# Patient Record
Sex: FEMALE | Race: WHITE | NOT HISPANIC OR LATINO
[De-identification: names, ages, dates, MRNs, and addresses within clinical notes are randomized per-mention and may not be internally consistent; named-entity substitution may affect disease eponyms.]

---

## 2017-01-05 ENCOUNTER — APPOINTMENT (OUTPATIENT)
Dept: INTERNAL MEDICINE | Facility: CLINIC | Age: 82
End: 2017-01-05

## 2017-01-05 ENCOUNTER — LABORATORY RESULT (OUTPATIENT)
Age: 82
End: 2017-01-05

## 2017-01-05 VITALS
OXYGEN SATURATION: 97 % | HEIGHT: 59 IN | DIASTOLIC BLOOD PRESSURE: 80 MMHG | WEIGHT: 127 LBS | SYSTOLIC BLOOD PRESSURE: 122 MMHG | HEART RATE: 102 BPM | BODY MASS INDEX: 25.6 KG/M2

## 2017-01-06 LAB
BASOPHILS # BLD AUTO: 0.1 K/UL
BASOPHILS NFR BLD AUTO: 1.9 %
EOSINOPHIL # BLD AUTO: 0.24 K/UL
EOSINOPHIL NFR BLD AUTO: 4.7 %
HCT VFR BLD CALC: 39.8 %
HGB BLD-MCNC: 12.4 G/DL
LYMPHOCYTES # BLD AUTO: 2.67 K/UL
LYMPHOCYTES NFR BLD AUTO: 51.9 %
MAN DIFF?: NORMAL
MCHC RBC-ENTMCNC: 27.9 PG
MCHC RBC-ENTMCNC: 31.2 GM/DL
MCV RBC AUTO: 89.4 FL
MONOCYTES # BLD AUTO: 0.58 K/UL
MONOCYTES NFR BLD AUTO: 11.3 %
NEUTROPHILS # BLD AUTO: 1.36 K/UL
NEUTROPHILS NFR BLD AUTO: 26.4 %
PLATELET # BLD AUTO: 244 K/UL
RBC # BLD: 4.45 M/UL
RBC # FLD: 20.9 %
WBC # FLD AUTO: 5.15 K/UL

## 2017-01-30 LAB — HEMOCCULT STL QL IA: POSITIVE

## 2017-02-23 ENCOUNTER — APPOINTMENT (OUTPATIENT)
Dept: COLORECTAL SURGERY | Facility: CLINIC | Age: 82
End: 2017-02-23

## 2017-03-09 ENCOUNTER — APPOINTMENT (OUTPATIENT)
Dept: COLORECTAL SURGERY | Facility: CLINIC | Age: 82
End: 2017-03-09

## 2017-03-23 ENCOUNTER — APPOINTMENT (OUTPATIENT)
Dept: COLORECTAL SURGERY | Facility: CLINIC | Age: 82
End: 2017-03-23

## 2017-05-01 ENCOUNTER — APPOINTMENT (OUTPATIENT)
Dept: INTERNAL MEDICINE | Facility: CLINIC | Age: 82
End: 2017-05-01

## 2017-05-01 VITALS
WEIGHT: 129 LBS | HEART RATE: 103 BPM | OXYGEN SATURATION: 98 % | HEIGHT: 59 IN | SYSTOLIC BLOOD PRESSURE: 125 MMHG | DIASTOLIC BLOOD PRESSURE: 80 MMHG | BODY MASS INDEX: 26 KG/M2

## 2017-05-02 LAB
25(OH)D3 SERPL-MCNC: 44.8 NG/ML
ALBUMIN SERPL ELPH-MCNC: 3.5 G/DL
ALP BLD-CCNC: 55 U/L
ALT SERPL-CCNC: 16 U/L
ANION GAP SERPL CALC-SCNC: 19 MMOL/L
AST SERPL-CCNC: 23 U/L
BASOPHILS # BLD AUTO: 0.07 K/UL
BASOPHILS NFR BLD AUTO: 1.1 %
BILIRUB SERPL-MCNC: 0.2 MG/DL
BUN SERPL-MCNC: 13 MG/DL
CALCIUM SERPL-MCNC: 9.4 MG/DL
CHLORIDE SERPL-SCNC: 97 MMOL/L
CHOLEST SERPL-MCNC: 213 MG/DL
CHOLEST/HDLC SERPL: 3.3 RATIO
CO2 SERPL-SCNC: 22 MMOL/L
CREAT SERPL-MCNC: 0.72 MG/DL
EOSINOPHIL # BLD AUTO: 0.13 K/UL
EOSINOPHIL NFR BLD AUTO: 2.1 %
GLUCOSE SERPL-MCNC: 121 MG/DL
HBA1C MFR BLD HPLC: 5.8 %
HCT VFR BLD CALC: 38 %
HDLC SERPL-MCNC: 64 MG/DL
HGB BLD-MCNC: 11.7 G/DL
IMM GRANULOCYTES NFR BLD AUTO: 0.2 %
LDLC SERPL CALC-MCNC: 113 MG/DL
LYMPHOCYTES # BLD AUTO: 1.58 K/UL
LYMPHOCYTES NFR BLD AUTO: 25.9 %
MAN DIFF?: NORMAL
MCHC RBC-ENTMCNC: 29 PG
MCHC RBC-ENTMCNC: 30.8 GM/DL
MCV RBC AUTO: 94.1 FL
MONOCYTES # BLD AUTO: 0.47 K/UL
MONOCYTES NFR BLD AUTO: 7.7 %
NEUTROPHILS # BLD AUTO: 3.84 K/UL
NEUTROPHILS NFR BLD AUTO: 63 %
PLATELET # BLD AUTO: 496 K/UL
POTASSIUM SERPL-SCNC: 4.3 MMOL/L
PROT SERPL-MCNC: 8.6 G/DL
RBC # BLD: 4.04 M/UL
RBC # FLD: 14.8 %
SODIUM SERPL-SCNC: 138 MMOL/L
TRIGL SERPL-MCNC: 178 MG/DL
TSH SERPL-ACNC: 0.87 UIU/ML
VIT B12 SERPL-MCNC: 1275 PG/ML
WBC # FLD AUTO: 6.1 K/UL

## 2017-05-04 ENCOUNTER — APPOINTMENT (OUTPATIENT)
Dept: COLORECTAL SURGERY | Facility: CLINIC | Age: 82
End: 2017-05-04

## 2017-05-04 DIAGNOSIS — K92.1 MELENA: ICD-10-CM

## 2017-05-04 DIAGNOSIS — K64.9 UNSPECIFIED HEMORRHOIDS: ICD-10-CM

## 2017-05-04 DIAGNOSIS — C18.9 MALIGNANT NEOPLASM OF COLON, UNSPECIFIED: ICD-10-CM

## 2017-06-15 ENCOUNTER — APPOINTMENT (OUTPATIENT)
Dept: INTERNAL MEDICINE | Facility: CLINIC | Age: 82
End: 2017-06-15

## 2017-06-15 VITALS
BODY MASS INDEX: 25 KG/M2 | HEIGHT: 59 IN | SYSTOLIC BLOOD PRESSURE: 124 MMHG | WEIGHT: 124 LBS | HEART RATE: 87 BPM | OXYGEN SATURATION: 96 % | DIASTOLIC BLOOD PRESSURE: 76 MMHG

## 2017-06-17 LAB
BASOPHILS # BLD AUTO: 0.07 K/UL
BASOPHILS NFR BLD AUTO: 1.4 %
EOSINOPHIL # BLD AUTO: 0.13 K/UL
EOSINOPHIL NFR BLD AUTO: 2.5 %
HCT VFR BLD CALC: 38.1 %
HGB BLD-MCNC: 12 G/DL
IMM GRANULOCYTES NFR BLD AUTO: 0.2 %
LYMPHOCYTES # BLD AUTO: 1.91 K/UL
LYMPHOCYTES NFR BLD AUTO: 37.1 %
MAN DIFF?: NORMAL
MCHC RBC-ENTMCNC: 28.8 PG
MCHC RBC-ENTMCNC: 31.5 GM/DL
MCV RBC AUTO: 91.6 FL
MONOCYTES # BLD AUTO: 0.56 K/UL
MONOCYTES NFR BLD AUTO: 10.9 %
NEUTROPHILS # BLD AUTO: 2.47 K/UL
NEUTROPHILS NFR BLD AUTO: 47.9 %
PLATELET # BLD AUTO: 232 K/UL
RBC # BLD: 4.16 M/UL
RBC # FLD: 14.8 %
WBC # FLD AUTO: 5.15 K/UL

## 2017-07-06 ENCOUNTER — MEDICATION RENEWAL (OUTPATIENT)
Age: 82
End: 2017-07-06

## 2017-08-14 ENCOUNTER — APPOINTMENT (OUTPATIENT)
Dept: INTERNAL MEDICINE | Facility: CLINIC | Age: 82
End: 2017-08-14
Payer: MEDICARE

## 2017-08-14 VITALS
OXYGEN SATURATION: 94 % | DIASTOLIC BLOOD PRESSURE: 60 MMHG | HEIGHT: 59 IN | SYSTOLIC BLOOD PRESSURE: 136 MMHG | BODY MASS INDEX: 24.8 KG/M2 | HEART RATE: 57 BPM | WEIGHT: 123 LBS

## 2017-08-14 PROCEDURE — 99214 OFFICE O/P EST MOD 30 MIN: CPT

## 2017-08-15 LAB
ALBUMIN SERPL ELPH-MCNC: 3.8 G/DL
ALP BLD-CCNC: 46 U/L
ALT SERPL-CCNC: 10 U/L
ANION GAP SERPL CALC-SCNC: 16 MMOL/L
AST SERPL-CCNC: 17 U/L
BASOPHILS # BLD AUTO: 0.05 K/UL
BASOPHILS NFR BLD AUTO: 1.2 %
BILIRUB SERPL-MCNC: 0.4 MG/DL
BUN SERPL-MCNC: 14 MG/DL
CALCIUM SERPL-MCNC: 9.1 MG/DL
CHLORIDE SERPL-SCNC: 101 MMOL/L
CHOLEST SERPL-MCNC: 230 MG/DL
CHOLEST/HDLC SERPL: 2.7 RATIO
CO2 SERPL-SCNC: 22 MMOL/L
CREAT SERPL-MCNC: 0.81 MG/DL
EOSINOPHIL # BLD AUTO: 0.18 K/UL
EOSINOPHIL NFR BLD AUTO: 4.3 %
FERRITIN SERPL-MCNC: 81 NG/ML
GLUCOSE SERPL-MCNC: 153 MG/DL
HBA1C MFR BLD HPLC: 6.2 %
HCT VFR BLD CALC: 39.5 %
HDLC SERPL-MCNC: 86 MG/DL
HGB BLD-MCNC: 12.5 G/DL
IMM GRANULOCYTES NFR BLD AUTO: 0.2 %
IRON SATN MFR SERPL: 64 %
IRON SERPL-MCNC: 162 UG/DL
LDLC SERPL CALC-MCNC: 119 MG/DL
LYMPHOCYTES # BLD AUTO: 1.58 K/UL
LYMPHOCYTES NFR BLD AUTO: 38 %
MAN DIFF?: NORMAL
MCHC RBC-ENTMCNC: 29.3 PG
MCHC RBC-ENTMCNC: 31.6 GM/DL
MCV RBC AUTO: 92.7 FL
MONOCYTES # BLD AUTO: 0.47 K/UL
MONOCYTES NFR BLD AUTO: 11.3 %
NEUTROPHILS # BLD AUTO: 1.87 K/UL
NEUTROPHILS NFR BLD AUTO: 45 %
PLATELET # BLD AUTO: 259 K/UL
POTASSIUM SERPL-SCNC: 4.1 MMOL/L
PROT SERPL-MCNC: 8.1 G/DL
RBC # BLD: 4.26 M/UL
RBC # FLD: 15.9 %
SODIUM SERPL-SCNC: 139 MMOL/L
TIBC SERPL-MCNC: 252 UG/DL
TRIGL SERPL-MCNC: 126 MG/DL
UIBC SERPL-MCNC: 90 UG/DL
WBC # FLD AUTO: 4.16 K/UL

## 2017-09-13 ENCOUNTER — TRANSCRIPTION ENCOUNTER (OUTPATIENT)
Age: 82
End: 2017-09-13

## 2017-09-28 ENCOUNTER — APPOINTMENT (OUTPATIENT)
Dept: INTERNAL MEDICINE | Facility: CLINIC | Age: 82
End: 2017-09-28
Payer: MEDICARE

## 2017-09-28 VITALS
DIASTOLIC BLOOD PRESSURE: 70 MMHG | HEART RATE: 96 BPM | HEIGHT: 59 IN | OXYGEN SATURATION: 95 % | WEIGHT: 122 LBS | SYSTOLIC BLOOD PRESSURE: 122 MMHG | BODY MASS INDEX: 24.6 KG/M2

## 2017-09-28 PROCEDURE — 99213 OFFICE O/P EST LOW 20 MIN: CPT

## 2017-10-23 ENCOUNTER — APPOINTMENT (OUTPATIENT)
Dept: INTERNAL MEDICINE | Facility: CLINIC | Age: 82
End: 2017-10-23

## 2017-11-09 ENCOUNTER — APPOINTMENT (OUTPATIENT)
Dept: INTERNAL MEDICINE | Facility: CLINIC | Age: 82
End: 2017-11-09
Payer: MEDICARE

## 2017-11-09 VITALS
WEIGHT: 121 LBS | DIASTOLIC BLOOD PRESSURE: 70 MMHG | BODY MASS INDEX: 24.39 KG/M2 | HEART RATE: 92 BPM | OXYGEN SATURATION: 97 % | SYSTOLIC BLOOD PRESSURE: 128 MMHG | HEIGHT: 59 IN

## 2017-11-09 PROCEDURE — 99213 OFFICE O/P EST LOW 20 MIN: CPT | Mod: 25

## 2017-11-09 PROCEDURE — G0008: CPT

## 2017-11-09 PROCEDURE — 90662 IIV NO PRSV INCREASED AG IM: CPT

## 2017-11-20 ENCOUNTER — APPOINTMENT (OUTPATIENT)
Dept: INTERNAL MEDICINE | Facility: CLINIC | Age: 82
End: 2017-11-20
Payer: MEDICARE

## 2017-11-20 VITALS
WEIGHT: 121 LBS | SYSTOLIC BLOOD PRESSURE: 150 MMHG | OXYGEN SATURATION: 96 % | HEIGHT: 59 IN | HEART RATE: 95 BPM | DIASTOLIC BLOOD PRESSURE: 86 MMHG | BODY MASS INDEX: 24.39 KG/M2

## 2017-11-20 PROCEDURE — 99213 OFFICE O/P EST LOW 20 MIN: CPT

## 2017-11-27 ENCOUNTER — FORM ENCOUNTER (OUTPATIENT)
Age: 82
End: 2017-11-27

## 2018-01-18 ENCOUNTER — APPOINTMENT (OUTPATIENT)
Dept: INTERNAL MEDICINE | Facility: CLINIC | Age: 83
End: 2018-01-18
Payer: MEDICARE

## 2018-01-18 VITALS
OXYGEN SATURATION: 95 % | HEART RATE: 108 BPM | SYSTOLIC BLOOD PRESSURE: 124 MMHG | WEIGHT: 124 LBS | BODY MASS INDEX: 25 KG/M2 | DIASTOLIC BLOOD PRESSURE: 80 MMHG | HEIGHT: 59 IN

## 2018-01-18 DIAGNOSIS — M25.511 PAIN IN RIGHT SHOULDER: ICD-10-CM

## 2018-01-18 DIAGNOSIS — D50.8 OTHER IRON DEFICIENCY ANEMIAS: ICD-10-CM

## 2018-01-18 PROCEDURE — 99214 OFFICE O/P EST MOD 30 MIN: CPT

## 2018-01-19 PROBLEM — M25.511 RIGHT SHOULDER PAIN: Status: ACTIVE | Noted: 2017-11-20

## 2018-01-19 LAB
25(OH)D3 SERPL-MCNC: 37.4 NG/ML
ALBUMIN SERPL ELPH-MCNC: 3.7 G/DL
ALP BLD-CCNC: 48 U/L
ALT SERPL-CCNC: 14 U/L
ANION GAP SERPL CALC-SCNC: 16 MMOL/L
AST SERPL-CCNC: 20 U/L
BASOPHILS # BLD AUTO: 0.04 K/UL
BASOPHILS NFR BLD AUTO: 0.9 %
BILIRUB SERPL-MCNC: 0.3 MG/DL
BUN SERPL-MCNC: 17 MG/DL
CALCIUM SERPL-MCNC: 9.3 MG/DL
CHLORIDE SERPL-SCNC: 98 MMOL/L
CHOLEST SERPL-MCNC: 224 MG/DL
CHOLEST/HDLC SERPL: 2.5 RATIO
CO2 SERPL-SCNC: 24 MMOL/L
CREAT SERPL-MCNC: 0.78 MG/DL
EOSINOPHIL # BLD AUTO: 0.14 K/UL
EOSINOPHIL NFR BLD AUTO: 3.2 %
GLUCOSE SERPL-MCNC: 135 MG/DL
HBA1C MFR BLD HPLC: 6 %
HCT VFR BLD CALC: 39.4 %
HDLC SERPL-MCNC: 91 MG/DL
HGB BLD-MCNC: 12.5 G/DL
IMM GRANULOCYTES NFR BLD AUTO: 0 %
LDLC SERPL CALC-MCNC: 106 MG/DL
LYMPHOCYTES # BLD AUTO: 1.53 K/UL
LYMPHOCYTES NFR BLD AUTO: 35.3 %
MAN DIFF?: NORMAL
MCHC RBC-ENTMCNC: 30.2 PG
MCHC RBC-ENTMCNC: 31.7 GM/DL
MCV RBC AUTO: 95.2 FL
MONOCYTES # BLD AUTO: 0.45 K/UL
MONOCYTES NFR BLD AUTO: 10.4 %
NEUTROPHILS # BLD AUTO: 2.18 K/UL
NEUTROPHILS NFR BLD AUTO: 50.2 %
PLATELET # BLD AUTO: 279 K/UL
POTASSIUM SERPL-SCNC: 4.2 MMOL/L
PROT SERPL-MCNC: 8.4 G/DL
RBC # BLD: 4.14 M/UL
RBC # FLD: 14.2 %
SODIUM SERPL-SCNC: 138 MMOL/L
TRIGL SERPL-MCNC: 137 MG/DL
TSH SERPL-ACNC: 1.24 UIU/ML
WBC # FLD AUTO: 4.34 K/UL

## 2018-01-21 ENCOUNTER — FORM ENCOUNTER (OUTPATIENT)
Age: 83
End: 2018-01-21

## 2018-01-23 ENCOUNTER — FORM ENCOUNTER (OUTPATIENT)
Age: 83
End: 2018-01-23

## 2018-01-30 ENCOUNTER — FORM ENCOUNTER (OUTPATIENT)
Age: 83
End: 2018-01-30

## 2018-04-26 ENCOUNTER — APPOINTMENT (OUTPATIENT)
Dept: INTERNAL MEDICINE | Facility: CLINIC | Age: 83
End: 2018-04-26
Payer: MEDICARE

## 2018-04-26 VITALS
DIASTOLIC BLOOD PRESSURE: 74 MMHG | WEIGHT: 126 LBS | SYSTOLIC BLOOD PRESSURE: 138 MMHG | HEART RATE: 91 BPM | BODY MASS INDEX: 25.45 KG/M2 | TEMPERATURE: 97.9 F | OXYGEN SATURATION: 96 %

## 2018-04-26 DIAGNOSIS — M25.579 PAIN IN UNSPECIFIED ANKLE AND JOINTS OF UNSPECIFIED FOOT: ICD-10-CM

## 2018-04-26 PROCEDURE — 99214 OFFICE O/P EST MOD 30 MIN: CPT

## 2018-08-27 ENCOUNTER — APPOINTMENT (OUTPATIENT)
Dept: INTERNAL MEDICINE | Facility: CLINIC | Age: 83
End: 2018-08-27
Payer: MEDICARE

## 2018-08-27 VITALS
WEIGHT: 121 LBS | HEIGHT: 59 IN | BODY MASS INDEX: 24.39 KG/M2 | TEMPERATURE: 97.7 F | DIASTOLIC BLOOD PRESSURE: 82 MMHG | SYSTOLIC BLOOD PRESSURE: 136 MMHG
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
## 2018-08-27 PROCEDURE — 99214 OFFICE O/P EST MOD 30 MIN: CPT

## 2018-08-27 NOTE — HISTORY OF PRESENT ILLNESS
[FreeTextEntry1] : f/u\par Here darron Yeh. [de-identified] : Here for f/u. As noted, she recently lost her 56 yo son ; in addition, today is the 15th  anniversary of the death of her other son, at 42, from cancer. She just turned 89.\par Feeling down but she and Rao are managing. They are busy with paperwork, etc, in settling the affairs of the son.\par Appetite is decreased, has lost about 5 lb.\par No bleeding, BMs are nl. \par 11/17 fell and injured right shoulder, did some P.T., has full ROM but still some soreness if she touches one certain spot.\par c/o occas crampiness of feet, not daily.\par

## 2018-08-27 NOTE — PHYSICAL EXAM
[No Acute Distress] : no acute distress [Supple] : supple [Clear to Auscultation] : lungs were clear to auscultation bilaterally [Regular Rhythm] : with a regular rhythm [No Edema] : there was no peripheral edema [No Joint Swelling] : no joint swelling [de-identified] : wheelchair-bd, blind [de-identified] : tender at  one spot in center of right shoulder (near bicep tendon) but full ROM

## 2018-08-29 LAB
ALBUMIN SERPL ELPH-MCNC: 3.9 G/DL
ALP BLD-CCNC: 47 U/L
ALT SERPL-CCNC: 13 U/L
ANION GAP SERPL CALC-SCNC: 12 MMOL/L
AST SERPL-CCNC: 19 U/L
BASOPHILS # BLD AUTO: 0.04 K/UL
BASOPHILS NFR BLD AUTO: 0.9 %
BILIRUB SERPL-MCNC: 0.4 MG/DL
BUN SERPL-MCNC: 17 MG/DL
CALCIUM SERPL-MCNC: 9.4 MG/DL
CHLORIDE SERPL-SCNC: 103 MMOL/L
CO2 SERPL-SCNC: 25 MMOL/L
CREAT SERPL-MCNC: 0.81 MG/DL
EOSINOPHIL # BLD AUTO: 0.12 K/UL
EOSINOPHIL NFR BLD AUTO: 2.7 %
GLUCOSE SERPL-MCNC: 158 MG/DL
HBA1C MFR BLD HPLC: 6.3 %
HCT VFR BLD CALC: 39.6 %
HGB BLD-MCNC: 12.8 G/DL
IMM GRANULOCYTES NFR BLD AUTO: 0.2 %
LYMPHOCYTES # BLD AUTO: 1.43 K/UL
LYMPHOCYTES NFR BLD AUTO: 31.7 %
MAN DIFF?: NORMAL
MCHC RBC-ENTMCNC: 30.5 PG
MCHC RBC-ENTMCNC: 32.3 GM/DL
MCV RBC AUTO: 94.3 FL
MONOCYTES # BLD AUTO: 0.43 K/UL
MONOCYTES NFR BLD AUTO: 9.5 %
NEUTROPHILS # BLD AUTO: 2.48 K/UL
NEUTROPHILS NFR BLD AUTO: 55 %
PLATELET # BLD AUTO: 265 K/UL
POTASSIUM SERPL-SCNC: 3.9 MMOL/L
PROT SERPL-MCNC: 7.6 G/DL
RBC # BLD: 4.2 M/UL
RBC # FLD: 14.4 %
SODIUM SERPL-SCNC: 140 MMOL/L
WBC # FLD AUTO: 4.51 K/UL

## 2018-12-03 ENCOUNTER — APPOINTMENT (OUTPATIENT)
Dept: INTERNAL MEDICINE | Facility: CLINIC | Age: 83
End: 2018-12-03
Payer: MEDICARE

## 2018-12-03 VITALS
TEMPERATURE: 97.7 F | HEART RATE: 125 BPM | DIASTOLIC BLOOD PRESSURE: 72 MMHG | HEIGHT: 59 IN | OXYGEN SATURATION: 98 % | SYSTOLIC BLOOD PRESSURE: 140 MMHG | WEIGHT: 124 LBS | BODY MASS INDEX: 25 KG/M2

## 2018-12-03 PROCEDURE — 90662 IIV NO PRSV INCREASED AG IM: CPT

## 2018-12-03 PROCEDURE — G0008: CPT

## 2018-12-03 PROCEDURE — 99214 OFFICE O/P EST MOD 30 MIN: CPT | Mod: 25

## 2018-12-03 RX ORDER — TRAMADOL HYDROCHLORIDE 50 MG/1
50 TABLET, COATED ORAL
Qty: 60 | Refills: 3 | Status: DISCONTINUED | COMMUNITY
Start: 2018-04-27 | End: 2018-12-03

## 2018-12-03 NOTE — HISTORY OF PRESENT ILLNESS
[FreeTextEntry1] : f/u [de-identified] : Here w son, Rao. \par c/o continued grief over loss of her other son recently who also lived w her.\par Not interested in going to support grp or  grief counseling or seeking therapy at 865.\par Appetite somehwat improved, wt is stable/gained.\par c/o continued knee pain, uses one advil bid, had tried Tramadol without relief. Worse upon waking up in am and if sitting in one position too long. \par c/o right shoulder pain since injury 11/17, already did P.T., doesn't have full ROM\par Has an aide 3x/wk for 6 hr a day, helps w ADLs, cooking, etc.\par Mostly blind, uses audiobooks.

## 2018-12-03 NOTE — ASSESSMENT
[FreeTextEntry1] : 89 yo woman w above conditions.\par Can use occas Advil for severe arthritis.\par Check routine labs today.\par Flu shot given.\par Requests Free style lite strips to check glu.\par Emotional support given.\par f/u 3 mos

## 2018-12-07 LAB
ALBUMIN SERPL ELPH-MCNC: 3.9 G/DL
ALP BLD-CCNC: 54 U/L
ALT SERPL-CCNC: 8 U/L
ANION GAP SERPL CALC-SCNC: 11 MMOL/L
AST SERPL-CCNC: 14 U/L
BASOPHILS # BLD AUTO: 0.07 K/UL
BASOPHILS NFR BLD AUTO: 1.6 %
BILIRUB SERPL-MCNC: 0.2 MG/DL
BUN SERPL-MCNC: 15 MG/DL
CALCIUM SERPL-MCNC: 9.5 MG/DL
CHLORIDE SERPL-SCNC: 99 MMOL/L
CHOLEST SERPL-MCNC: 217 MG/DL
CHOLEST/HDLC SERPL: 2.5 RATIO
CO2 SERPL-SCNC: 26 MMOL/L
CREAT SERPL-MCNC: 0.89 MG/DL
EOSINOPHIL # BLD AUTO: 0.23 K/UL
EOSINOPHIL NFR BLD AUTO: 5.1 %
GLUCOSE SERPL-MCNC: 111 MG/DL
HBA1C MFR BLD HPLC: 6 %
HCT VFR BLD CALC: 40.2 %
HDLC SERPL-MCNC: 87 MG/DL
HGB BLD-MCNC: 12.8 G/DL
IMM GRANULOCYTES NFR BLD AUTO: 0 %
LDLC SERPL CALC-MCNC: 114 MG/DL
LYMPHOCYTES # BLD AUTO: 1.59 K/UL
LYMPHOCYTES NFR BLD AUTO: 35.3 %
MAN DIFF?: NORMAL
MCHC RBC-ENTMCNC: 29.6 PG
MCHC RBC-ENTMCNC: 31.8 GM/DL
MCV RBC AUTO: 92.8 FL
MONOCYTES # BLD AUTO: 0.49 K/UL
MONOCYTES NFR BLD AUTO: 10.9 %
NEUTROPHILS # BLD AUTO: 2.13 K/UL
NEUTROPHILS NFR BLD AUTO: 47.1 %
PLATELET # BLD AUTO: 304 K/UL
POTASSIUM SERPL-SCNC: 4.4 MMOL/L
PROT SERPL-MCNC: 8 G/DL
RBC # BLD: 4.33 M/UL
RBC # FLD: 14.1 %
SODIUM SERPL-SCNC: 136 MMOL/L
TRIGL SERPL-MCNC: 78 MG/DL
WBC # FLD AUTO: 4.51 K/UL

## 2019-03-03 ENCOUNTER — TRANSCRIPTION ENCOUNTER (OUTPATIENT)
Age: 84
End: 2019-03-03

## 2019-03-18 ENCOUNTER — APPOINTMENT (OUTPATIENT)
Dept: INTERNAL MEDICINE | Facility: CLINIC | Age: 84
End: 2019-03-18
Payer: MEDICARE

## 2019-03-18 VITALS
DIASTOLIC BLOOD PRESSURE: 70 MMHG | HEART RATE: 98 BPM | OXYGEN SATURATION: 95 % | WEIGHT: 125 LBS | TEMPERATURE: 98 F | SYSTOLIC BLOOD PRESSURE: 122 MMHG | HEIGHT: 59 IN | BODY MASS INDEX: 25.2 KG/M2

## 2019-03-18 DIAGNOSIS — R32 UNSPECIFIED URINARY INCONTINENCE: ICD-10-CM

## 2019-03-18 PROCEDURE — 99214 OFFICE O/P EST MOD 30 MIN: CPT

## 2019-03-18 NOTE — HISTORY OF PRESENT ILLNESS
[FreeTextEntry1] : f/u [de-identified] : has a list of concerns:\par trouble getting over the death of her son;\par right foot broke toe 18 mo ago, healed but now mis-shapen and runbbing the other toes, also it hurts\par feet always cold;\par urin incont;\par right shoulder x 18 mos did P.T. already;\par more concerns about renal function and diabetes/prediabetes; not on metf (was on tiny dose in past)\par knees , legs hurt- using 1 advil bid\par

## 2019-03-18 NOTE — ASSESSMENT
[FreeTextEntry1] : Pt w above concerns as outlined.\par Has done counseling re death of her son.\par will refer to Ortho for shoulder;\par Podiatry for toe\par Urogyn for incont\par LE art  dopplers \par check routine labs\par f/u 3 mos

## 2019-03-22 LAB
ANION GAP SERPL CALC-SCNC: 13 MMOL/L
BASOPHILS # BLD AUTO: 0.06 K/UL
BASOPHILS NFR BLD AUTO: 1.5 %
BUN SERPL-MCNC: 20 MG/DL
CALCIUM SERPL-MCNC: 9 MG/DL
CHLORIDE SERPL-SCNC: 103 MMOL/L
CO2 SERPL-SCNC: 25 MMOL/L
CREAT SERPL-MCNC: 0.8 MG/DL
EOSINOPHIL # BLD AUTO: 0.15 K/UL
EOSINOPHIL NFR BLD AUTO: 3.7 %
GLUCOSE SERPL-MCNC: 160 MG/DL
HBA1C MFR BLD HPLC: 6.1 %
HCT VFR BLD CALC: 41 %
HGB BLD-MCNC: 12.6 G/DL
IMM GRANULOCYTES NFR BLD AUTO: 0 %
LYMPHOCYTES # BLD AUTO: 1.16 K/UL
LYMPHOCYTES NFR BLD AUTO: 28.3 %
MAN DIFF?: NORMAL
MCHC RBC-ENTMCNC: 29 PG
MCHC RBC-ENTMCNC: 30.7 GM/DL
MCV RBC AUTO: 94.3 FL
MONOCYTES # BLD AUTO: 0.38 K/UL
MONOCYTES NFR BLD AUTO: 9.3 %
NEUTROPHILS # BLD AUTO: 2.35 K/UL
NEUTROPHILS NFR BLD AUTO: 57.2 %
PLATELET # BLD AUTO: 245 K/UL
POTASSIUM SERPL-SCNC: 4.4 MMOL/L
RBC # BLD: 4.35 M/UL
RBC # FLD: 14.6 %
SODIUM SERPL-SCNC: 141 MMOL/L
WBC # FLD AUTO: 4.1 K/UL

## 2019-06-18 ENCOUNTER — APPOINTMENT (OUTPATIENT)
Dept: INTERNAL MEDICINE | Facility: CLINIC | Age: 84
End: 2019-06-18
Payer: MEDICARE

## 2019-06-18 VITALS
WEIGHT: 123 LBS | SYSTOLIC BLOOD PRESSURE: 136 MMHG | BODY MASS INDEX: 24.8 KG/M2 | HEART RATE: 102 BPM | OXYGEN SATURATION: 95 % | DIASTOLIC BLOOD PRESSURE: 73 MMHG | HEIGHT: 59 IN | TEMPERATURE: 98.4 F

## 2019-06-18 DIAGNOSIS — L30.1 DYSHIDROSIS [POMPHOLYX]: ICD-10-CM

## 2019-06-18 PROCEDURE — 99214 OFFICE O/P EST MOD 30 MIN: CPT

## 2019-06-18 NOTE — ASSESSMENT
[FreeTextEntry1] : 1. Depression/grief; I had some suggestions for her- support grp, therpist, /add another agent to Mirtaz- will only agree to the latter.\par  2. pain, debilitation, DJD severe: CBD oil, acupuncutre, P.T.,- declines CBD oil, will consider the other ideas\par 3. Triamcin .1% sent in for elbows\par  check labs\par Emotional support offered.

## 2019-06-18 NOTE — HISTORY OF PRESENT ILLNESS
[FreeTextEntry1] : f/u [de-identified] : c/o very depressed, since her son  10 mo ago;\par not willing to see a counselor or join a grief group;  is wiling to go up on her Mirtaz from half tab to whole tab.\par Toleraitng half metf 500 daily, wants labs checked\par scaly rash both elbows wants crm\par has aide 3x/wk\par poor appetite due to can't see and also depressed;\par will need cataract surg soon, has been putting off x 2 yrs\par

## 2019-06-21 LAB
ALBUMIN SERPL ELPH-MCNC: 4.1 G/DL
ALP BLD-CCNC: 57 U/L
ALT SERPL-CCNC: 11 U/L
ANION GAP SERPL CALC-SCNC: 16 MMOL/L
AST SERPL-CCNC: 19 U/L
BASOPHILS # BLD AUTO: 0.06 K/UL
BASOPHILS NFR BLD AUTO: 1.2 %
BILIRUB SERPL-MCNC: 0.4 MG/DL
BUN SERPL-MCNC: 17 MG/DL
CALCIUM SERPL-MCNC: 9.5 MG/DL
CHLORIDE SERPL-SCNC: 100 MMOL/L
CO2 SERPL-SCNC: 20 MMOL/L
CREAT SERPL-MCNC: 0.73 MG/DL
EOSINOPHIL # BLD AUTO: 0.18 K/UL
EOSINOPHIL NFR BLD AUTO: 3.6 %
ESTIMATED AVERAGE GLUCOSE: 126 MG/DL
GLUCOSE SERPL-MCNC: 121 MG/DL
HBA1C MFR BLD HPLC: 6 %
HCT VFR BLD CALC: 39.9 %
HGB BLD-MCNC: 12.3 G/DL
IMM GRANULOCYTES NFR BLD AUTO: 0.2 %
LYMPHOCYTES # BLD AUTO: 1.46 K/UL
LYMPHOCYTES NFR BLD AUTO: 29.3 %
MAN DIFF?: NORMAL
MCHC RBC-ENTMCNC: 28.5 PG
MCHC RBC-ENTMCNC: 30.8 GM/DL
MCV RBC AUTO: 92.4 FL
MONOCYTES # BLD AUTO: 0.49 K/UL
MONOCYTES NFR BLD AUTO: 9.8 %
NEUTROPHILS # BLD AUTO: 2.79 K/UL
NEUTROPHILS NFR BLD AUTO: 55.9 %
PLATELET # BLD AUTO: 277 K/UL
POTASSIUM SERPL-SCNC: 4.5 MMOL/L
PROT SERPL-MCNC: 7.2 G/DL
RBC # BLD: 4.32 M/UL
RBC # FLD: 14.6 %
SODIUM SERPL-SCNC: 136 MMOL/L
TSH SERPL-ACNC: 2 UIU/ML
WBC # FLD AUTO: 4.99 K/UL

## 2019-07-15 ENCOUNTER — MEDICATION RENEWAL (OUTPATIENT)
Age: 84
End: 2019-07-15

## 2019-08-13 ENCOUNTER — MEDICATION RENEWAL (OUTPATIENT)
Age: 84
End: 2019-08-13

## 2019-09-18 ENCOUNTER — APPOINTMENT (OUTPATIENT)
Dept: INTERNAL MEDICINE | Facility: CLINIC | Age: 84
End: 2019-09-18
Payer: MEDICARE

## 2019-09-18 VITALS
BODY MASS INDEX: 24.8 KG/M2 | OXYGEN SATURATION: 93 % | HEART RATE: 71 BPM | HEIGHT: 59 IN | DIASTOLIC BLOOD PRESSURE: 72 MMHG | WEIGHT: 123 LBS | SYSTOLIC BLOOD PRESSURE: 136 MMHG | TEMPERATURE: 97.8 F

## 2019-09-18 DIAGNOSIS — E11.65 TYPE 2 DIABETES MELLITUS WITH HYPERGLYCEMIA: ICD-10-CM

## 2019-09-18 LAB — HBA1C MFR BLD HPLC: 6

## 2019-09-18 PROCEDURE — 99214 OFFICE O/P EST MOD 30 MIN: CPT | Mod: 25

## 2019-09-18 PROCEDURE — 83036 HEMOGLOBIN GLYCOSYLATED A1C: CPT | Mod: QW

## 2019-09-18 PROCEDURE — G0008: CPT

## 2019-09-18 PROCEDURE — 90662 IIV NO PRSV INCREASED AG IM: CPT

## 2019-09-18 NOTE — HISTORY OF PRESENT ILLNESS
[FreeTextEntry8] : Here w son, squeezed in for acute appt\par \par 2 wks ago fell asleep while reading a book on tape-(plays in an old-type cassette machine which is bulky) and the machine was pressing against her left lower ribs while she was sleeping on her side, listening.\par Subsequently noted pain in that area, ?bruise, cannot see. Did not fall. No prior trauma to that rib.\par No SOB no CP\par

## 2019-09-18 NOTE — ASSESSMENT
[FreeTextEntry1] : A1c 6.\par \par Hi dose flu shot given.\par \par Imp: Pt w injury/contusion  to left lower ant rib area. Tender over the spot.  Lungs clear. Reproduced w movemt.\par Doubtful of fracture.\par Would treat w Advil -takes 1 tab bid.\par Course of improvemt should be about 2-3 more wks. If not resolved will check xray.\par \par contin metf once a day; contin Mirtazapine.\par \par discussed cbd oil, tumeric, glucosamine for arthritis.\par f/u one mo\par

## 2019-09-23 ENCOUNTER — APPOINTMENT (OUTPATIENT)
Dept: INTERNAL MEDICINE | Facility: CLINIC | Age: 84
End: 2019-09-23

## 2019-09-24 ENCOUNTER — APPOINTMENT (OUTPATIENT)
Dept: INTERNAL MEDICINE | Facility: CLINIC | Age: 84
End: 2019-09-24

## 2019-10-14 ENCOUNTER — APPOINTMENT (OUTPATIENT)
Dept: INTERNAL MEDICINE | Facility: CLINIC | Age: 84
End: 2019-10-14
Payer: MEDICARE

## 2019-10-14 ENCOUNTER — NON-APPOINTMENT (OUTPATIENT)
Age: 84
End: 2019-10-14

## 2019-10-14 VITALS
BODY MASS INDEX: 25 KG/M2 | DIASTOLIC BLOOD PRESSURE: 88 MMHG | WEIGHT: 124 LBS | OXYGEN SATURATION: 96 % | HEIGHT: 59 IN | HEART RATE: 97 BPM | TEMPERATURE: 98 F | SYSTOLIC BLOOD PRESSURE: 138 MMHG

## 2019-10-14 DIAGNOSIS — R07.81 PLEURODYNIA: ICD-10-CM

## 2019-10-14 PROCEDURE — G0439: CPT

## 2019-10-14 PROCEDURE — G0444 DEPRESSION SCREEN ANNUAL: CPT

## 2019-10-14 NOTE — HISTORY OF PRESENT ILLNESS
[de-identified] : 89 yo woman\par Chronic pain/OA severe diffuse;\par CC '11 SHARYN\par Blindness-Mac degen\par anemia, depr, insomnia\par \par recent cataracts-Dr. Skaggs, wants another opinion\par Inuury to left lower ribs, would like xray, pain still there; ankles feel tight, would like xrays\par \par had aide 3x/wk 5 hr/d\par \par bMD:due\par \par \par  [FreeTextEntry1] : cpe

## 2019-10-14 NOTE — HEALTH RISK ASSESSMENT
[Fair] : ~his/her~ current health as fair  [Good] : ~his/her~  mood as  good [Yes] : Yes [No falls in past year] : Patient reported no falls in the past year [0] : 2) Feeling down, depressed, or hopeless: Not at all (0) [HIV test declined] : HIV test declined [Hepatitis C test declined] : Hepatitis C test declined [None] : None [With Family] : lives with family [] :  [Retired] : retired [Fully functional (bathing, dressing, toileting, transferring, walking, feeding)] : Fully functional (bathing, dressing, toileting, transferring, walking, feeding) [Reports changes in hearing] : Reports changes in hearing [Fully functional (using the telephone, shopping, preparing meals, housekeeping, doing laundry, using] : Fully functional and needs no help or supervision to perform IADLs (using the telephone, shopping, preparing meals, housekeeping, doing laundry, using transportation, managing medications and managing finances) [Reports changes in vision] : Reports changes in vision [With Patient/Caregiver] : With Patient/Caregiver [Name: ___] : Health Care Proxy's Name: [unfilled]  [Relationship: ___] : Relationship: [unfilled] [I will adhere to the patient's wishes as expressed in the advance directive except as noted below.] : I will adhere to the patient's wishes as expressed in the advance directive except as noted below [DNR] : DNR [] : No [de-identified] : no [de-identified] : few beers/wk [Change in mental status noted] : No change in mental status noted [de-identified] : not good [High Risk Behavior] : no high risk behavior [Reports changes in dental health] : Reports no changes in dental health [Sexually Active] : not sexually active [Language] : denies difficulty with language [de-identified] : with son; has aide 3x/wk 5 hrs [AdvancecareDate] : 10/14/19

## 2019-10-14 NOTE — ASSESSMENT
[FreeTextEntry1] : Pt w above conditions.\par EKG no change.\par check routine labs\par BMD\par xray ribs\par art dopplers LEs\par ankle xray per request

## 2019-10-14 NOTE — PHYSICAL EXAM
[No Acute Distress] : no acute distress [Well Nourished] : well nourished [Well Developed] : well developed [Well-Appearing] : well-appearing [Normal Sclera/Conjunctiva] : normal sclera/conjunctiva [PERRL] : pupils equal round and reactive to light [Normal Outer Ear/Nose] : the outer ears and nose were normal in appearance [Normal Oropharynx] : the oropharynx was normal [EOMI] : extraocular movements intact [Supple] : supple [No JVD] : no jugular venous distention [No Lymphadenopathy] : no lymphadenopathy [Thyroid Normal, No Nodules] : the thyroid was normal and there were no nodules present [No Accessory Muscle Use] : no accessory muscle use [No Respiratory Distress] : no respiratory distress  [Normal Rate] : normal rate  [Clear to Auscultation] : lungs were clear to auscultation bilaterally [Normal S1, S2] : normal S1 and S2 [Regular Rhythm] : with a regular rhythm [No Murmur] : no murmur heard [No Abdominal Bruit] : a ~M bruit was not heard ~T in the abdomen [No Carotid Bruits] : no carotid bruits [Pedal Pulses Present] : the pedal pulses are present [No Edema] : there was no peripheral edema [No Varicosities] : no varicosities [No Palpable Aorta] : no palpable aorta [Soft] : abdomen soft [No Extremity Clubbing/Cyanosis] : no extremity clubbing/cyanosis [Non Tender] : non-tender [Non-distended] : non-distended [No Masses] : no abdominal mass palpated [No HSM] : no HSM [Normal Bowel Sounds] : normal bowel sounds [No CVA Tenderness] : no CVA  tenderness [Normal Anterior Cervical Nodes] : no anterior cervical lymphadenopathy [Normal Posterior Cervical Nodes] : no posterior cervical lymphadenopathy [No Joint Swelling] : no joint swelling [No Spinal Tenderness] : no spinal tenderness [No Rash] : no rash [Grossly Normal Strength/Tone] : grossly normal strength/tone [Coordination Grossly Intact] : coordination grossly intact [No Focal Deficits] : no focal deficits [Deep Tendon Reflexes (DTR)] : deep tendon reflexes were 2+ and symmetric [Normal Gait] : normal gait [Normal Affect] : the affect was normal [Normal Insight/Judgement] : insight and judgment were intact [Comprehensive Foot Exam Normal] : Right and left foot were examined and both feet are normal. No ulcers in either foot. Toes are normal and with full ROM.  Normal tactile sensation with monofilament testing throughout both feet [de-identified] : tender left lower ribs [de-identified] : feet are warm w good pulses

## 2019-10-18 LAB
ALBUMIN SERPL ELPH-MCNC: 4.1 G/DL
ALP BLD-CCNC: 53 U/L
ALT SERPL-CCNC: 10 U/L
ANION GAP SERPL CALC-SCNC: 12 MMOL/L
AST SERPL-CCNC: 17 U/L
BASOPHILS # BLD AUTO: 0.07 K/UL
BASOPHILS NFR BLD AUTO: 1.5 %
BILIRUB SERPL-MCNC: 0.3 MG/DL
BUN SERPL-MCNC: 15 MG/DL
CALCIUM SERPL-MCNC: 9 MG/DL
CHLORIDE SERPL-SCNC: 102 MMOL/L
CO2 SERPL-SCNC: 23 MMOL/L
CREAT SERPL-MCNC: 0.68 MG/DL
EOSINOPHIL # BLD AUTO: 0.15 K/UL
EOSINOPHIL NFR BLD AUTO: 3.2 %
ESTIMATED AVERAGE GLUCOSE: 128 MG/DL
GLUCOSE SERPL-MCNC: 123 MG/DL
HBA1C MFR BLD HPLC: 6.1 %
HCT VFR BLD CALC: 38.1 %
HGB BLD-MCNC: 12.3 G/DL
IMM GRANULOCYTES NFR BLD AUTO: 0.4 %
LYMPHOCYTES # BLD AUTO: 1.67 K/UL
LYMPHOCYTES NFR BLD AUTO: 35.5 %
MAN DIFF?: NORMAL
MCHC RBC-ENTMCNC: 28.8 PG
MCHC RBC-ENTMCNC: 32.3 GM/DL
MCV RBC AUTO: 89.2 FL
MONOCYTES # BLD AUTO: 0.5 K/UL
MONOCYTES NFR BLD AUTO: 10.6 %
NEUTROPHILS # BLD AUTO: 2.3 K/UL
NEUTROPHILS NFR BLD AUTO: 48.8 %
PLATELET # BLD AUTO: 264 K/UL
POTASSIUM SERPL-SCNC: 4.4 MMOL/L
PROT SERPL-MCNC: 7.3 G/DL
RBC # BLD: 4.27 M/UL
RBC # FLD: 14.2 %
SODIUM SERPL-SCNC: 137 MMOL/L
WBC # FLD AUTO: 4.71 K/UL

## 2019-10-20 ENCOUNTER — FORM ENCOUNTER (OUTPATIENT)
Age: 84
End: 2019-10-20

## 2019-10-21 ENCOUNTER — APPOINTMENT (OUTPATIENT)
Dept: RADIOLOGY | Facility: IMAGING CENTER | Age: 84
End: 2019-10-21
Payer: MEDICARE

## 2019-10-21 ENCOUNTER — OUTPATIENT (OUTPATIENT)
Dept: OUTPATIENT SERVICES | Facility: HOSPITAL | Age: 84
LOS: 1 days | End: 2019-10-21
Payer: COMMERCIAL

## 2019-10-21 DIAGNOSIS — M19.90 UNSPECIFIED OSTEOARTHRITIS, UNSPECIFIED SITE: ICD-10-CM

## 2019-10-21 DIAGNOSIS — R07.81 PLEURODYNIA: ICD-10-CM

## 2019-10-21 PROCEDURE — 73610 X-RAY EXAM OF ANKLE: CPT

## 2019-10-21 PROCEDURE — 71100 X-RAY EXAM RIBS UNI 2 VIEWS: CPT | Mod: 26

## 2019-10-21 PROCEDURE — 77080 DXA BONE DENSITY AXIAL: CPT | Mod: 26

## 2019-10-21 PROCEDURE — 77080 DXA BONE DENSITY AXIAL: CPT

## 2019-10-21 PROCEDURE — 73610 X-RAY EXAM OF ANKLE: CPT | Mod: 26,50

## 2019-10-21 PROCEDURE — 71100 X-RAY EXAM RIBS UNI 2 VIEWS: CPT

## 2019-10-22 ENCOUNTER — CHART COPY (OUTPATIENT)
Age: 84
End: 2019-10-22

## 2019-11-07 ENCOUNTER — APPOINTMENT (OUTPATIENT)
Dept: INTERNAL MEDICINE | Facility: CLINIC | Age: 84
End: 2019-11-07
Payer: MEDICARE

## 2019-11-07 VITALS
BODY MASS INDEX: 24.6 KG/M2 | TEMPERATURE: 97.9 F | HEART RATE: 91 BPM | WEIGHT: 122 LBS | DIASTOLIC BLOOD PRESSURE: 68 MMHG | OXYGEN SATURATION: 95 % | SYSTOLIC BLOOD PRESSURE: 125 MMHG | HEIGHT: 59 IN

## 2019-11-07 PROCEDURE — 99213 OFFICE O/P EST LOW 20 MIN: CPT

## 2019-11-07 NOTE — ASSESSMENT
[FreeTextEntry1] : Early URI, no fever, no findings on lung exam.\par Sx treatmt reviewed.\par Call for any fever or worsening cough, she admits to feeling better today.\par Podiatrist for ingrown nail.

## 2019-11-07 NOTE — HISTORY OF PRESENT ILLNESS
[FreeTextEntry8] : coughing x 2 dd, wheezy first nite, better last night;\par no fever, chills; no sore thr or congestion. wanted to be checked since her son was sick past 2 wks- first w fever/cough now laryngitis.\par He say it was not the flu,  altho not tested and did have hi temp to 104 initially;he did have Abx not sure which.\par \par She took lozenges.

## 2019-12-16 ENCOUNTER — NON-APPOINTMENT (OUTPATIENT)
Age: 84
End: 2019-12-16

## 2019-12-16 ENCOUNTER — APPOINTMENT (OUTPATIENT)
Dept: INTERNAL MEDICINE | Facility: CLINIC | Age: 84
End: 2019-12-16
Payer: MEDICARE

## 2019-12-16 VITALS
SYSTOLIC BLOOD PRESSURE: 142 MMHG | DIASTOLIC BLOOD PRESSURE: 70 MMHG | HEIGHT: 59 IN | HEART RATE: 98 BPM | WEIGHT: 122 LBS | OXYGEN SATURATION: 98 % | TEMPERATURE: 97.4 F | BODY MASS INDEX: 24.6 KG/M2

## 2019-12-16 DIAGNOSIS — J06.9 ACUTE UPPER RESPIRATORY INFECTION, UNSPECIFIED: ICD-10-CM

## 2019-12-16 PROCEDURE — 99214 OFFICE O/P EST MOD 30 MIN: CPT

## 2019-12-16 RX ORDER — METFORMIN HYDROCHLORIDE 500 MG/1
500 TABLET, COATED ORAL DAILY
Qty: 90 | Refills: 3 | Status: DISCONTINUED | COMMUNITY
Start: 2019-03-22 | End: 2019-12-16

## 2019-12-16 NOTE — ASSESSMENT
[Patient Optimized for Surgery] : Patient optimized for surgery [FreeTextEntry4] : Low risk pt for low risk procedure.\par EKG no change, no acute changes.\par No contraindication to proceed.

## 2019-12-16 NOTE — HISTORY OF PRESENT ILLNESS
[No Pertinent Cardiac History] : no history of aortic stenosis, atrial fibrillation, coronary artery disease, recent myocardial infarction, or implantable device/pacemaker [No Pertinent Pulmonary History] : no history of asthma, COPD, sleep apnea, or smoking [No Adverse Anesthesia Reaction] : no adverse anesthesia reaction in self or family member [Family Member] : no family member with adverse anesthesia reaction/sudden death [Self] : no previous adverse anesthesia reaction [Chronic Kidney Disease] : no chronic kidney disease [Chronic Anticoagulation] : no chronic anticoagulation [Diabetes] : no diabetes [FreeTextEntry2] : 1/6/20 [FreeTextEntry1] : cataract left [FreeTextEntry4] : 89 yo woman for cataract surgery clearance.\par h/o Mac degen/blindness, DJD/chronic pain,  preDM, Colon ca, anemia, depr/anxiety.\par \par Prior surg incl  Partial Colectomy '11, Hip replacemt.'13, Cholecystec '06\par No complic from anasth or bleeding. no f/h same.\par \par Meds= Mirtazapine, iron, Omepr\par \par Recent URI now improved. Sl cough, dry, sore thr in ams only. no fever/chills.\par Vax uptodate [FreeTextEntry3] : Dr Petersen

## 2020-01-16 ENCOUNTER — APPOINTMENT (OUTPATIENT)
Dept: INTERNAL MEDICINE | Facility: CLINIC | Age: 85
End: 2020-01-16

## 2020-03-16 ENCOUNTER — APPOINTMENT (OUTPATIENT)
Dept: INTERNAL MEDICINE | Facility: CLINIC | Age: 85
End: 2020-03-16

## 2020-08-07 ENCOUNTER — APPOINTMENT (OUTPATIENT)
Dept: INTERNAL MEDICINE | Facility: CLINIC | Age: 85
End: 2020-08-07
Payer: MEDICARE

## 2020-08-07 VITALS
DIASTOLIC BLOOD PRESSURE: 78 MMHG | TEMPERATURE: 98.6 F | HEART RATE: 96 BPM | BODY MASS INDEX: 24.6 KG/M2 | HEIGHT: 59 IN | OXYGEN SATURATION: 95 % | WEIGHT: 122 LBS | SYSTOLIC BLOOD PRESSURE: 156 MMHG

## 2020-08-07 DIAGNOSIS — M79.662 PAIN IN LEFT LOWER LEG: ICD-10-CM

## 2020-08-07 PROCEDURE — 99213 OFFICE O/P EST LOW 20 MIN: CPT

## 2020-08-07 NOTE — ASSESSMENT
[FreeTextEntry1] : Pt w leg pain; r/o Bakers cyst, r/o DVT\par LE dopplers\par tylenol for pain, moist heat\par check routine labs, d-dimer\par

## 2020-08-07 NOTE — PHYSICAL EXAM
[No Acute Distress] : no acute distress [Well Nourished] : well nourished [Normal Sclera/Conjunctiva] : normal sclera/conjunctiva [Well-Appearing] : well-appearing [Well Developed] : well developed [Normal Oropharynx] : the oropharynx was normal [Normal Outer Ear/Nose] : the outer ears and nose were normal in appearance [PERRL] : pupils equal round and reactive to light [EOMI] : extraocular movements intact [No Lymphadenopathy] : no lymphadenopathy [Supple] : supple [No JVD] : no jugular venous distention [Thyroid Normal, No Nodules] : the thyroid was normal and there were no nodules present [No Respiratory Distress] : no respiratory distress  [No Accessory Muscle Use] : no accessory muscle use [Normal Rate] : normal rate  [Clear to Auscultation] : lungs were clear to auscultation bilaterally [Regular Rhythm] : with a regular rhythm [Normal S1, S2] : normal S1 and S2 [No Murmur] : no murmur heard [No Carotid Bruits] : no carotid bruits [No Varicosities] : no varicosities [No Abdominal Bruit] : a ~M bruit was not heard ~T in the abdomen [Pedal Pulses Present] : the pedal pulses are present [No Palpable Aorta] : no palpable aorta [No Edema] : there was no peripheral edema [No Extremity Clubbing/Cyanosis] : no extremity clubbing/cyanosis [Non Tender] : non-tender [Soft] : abdomen soft [Non-distended] : non-distended [No HSM] : no HSM [No Masses] : no abdominal mass palpated [Normal Bowel Sounds] : normal bowel sounds [Normal Posterior Cervical Nodes] : no posterior cervical lymphadenopathy [No CVA Tenderness] : no CVA  tenderness [Normal Anterior Cervical Nodes] : no anterior cervical lymphadenopathy [Grossly Normal Strength/Tone] : grossly normal strength/tone [No Joint Swelling] : no joint swelling [No Spinal Tenderness] : no spinal tenderness [Coordination Grossly Intact] : coordination grossly intact [No Rash] : no rash [No Focal Deficits] : no focal deficits [Normal Insight/Judgement] : insight and judgment were intact [de-identified] : no calf pain /swelling [Normal Gait] : normal gait [Normal Affect] : the affect was normal [de-identified] : no mass in popliteal fossa, no swelling

## 2020-08-07 NOTE — HISTORY OF PRESENT ILLNESS
[FreeTextEntry8] : left leg pain behind knee x 1 wk\par feels tight\par no trauma\par trouble standing, bearing wt\par \par hearing is worse\par \par turns 89 tomorrow\par

## 2020-08-09 LAB
25(OH)D3 SERPL-MCNC: 41.3 NG/ML
ALBUMIN SERPL ELPH-MCNC: 4 G/DL
ALP BLD-CCNC: 61 U/L
ALT SERPL-CCNC: 9 U/L
ANION GAP SERPL CALC-SCNC: 16 MMOL/L
AST SERPL-CCNC: 16 U/L
BASOPHILS # BLD AUTO: 0.07 K/UL
BASOPHILS NFR BLD AUTO: 1.3 %
BILIRUB SERPL-MCNC: 0.2 MG/DL
BUN SERPL-MCNC: 23 MG/DL
CALCIUM SERPL-MCNC: 9.5 MG/DL
CHLORIDE SERPL-SCNC: 105 MMOL/L
CHOLEST SERPL-MCNC: 218 MG/DL
CHOLEST/HDLC SERPL: 3.2 RATIO
CO2 SERPL-SCNC: 24 MMOL/L
CREAT SERPL-MCNC: 0.98 MG/DL
DEPRECATED D DIMER PPP IA-ACNC: 969 NG/ML DDU
EOSINOPHIL # BLD AUTO: 0.24 K/UL
EOSINOPHIL NFR BLD AUTO: 4.6 %
ESTIMATED AVERAGE GLUCOSE: 123 MG/DL
GLUCOSE SERPL-MCNC: 161 MG/DL
HBA1C MFR BLD HPLC: 5.9 %
HCT VFR BLD CALC: 41 %
HDLC SERPL-MCNC: 69 MG/DL
HGB BLD-MCNC: 12.7 G/DL
IMM GRANULOCYTES NFR BLD AUTO: 0.2 %
LDLC SERPL CALC-MCNC: 111 MG/DL
LYMPHOCYTES # BLD AUTO: 1.61 K/UL
LYMPHOCYTES NFR BLD AUTO: 31 %
MAN DIFF?: NORMAL
MCHC RBC-ENTMCNC: 29.5 PG
MCHC RBC-ENTMCNC: 31 GM/DL
MCV RBC AUTO: 95.3 FL
MONOCYTES # BLD AUTO: 0.44 K/UL
MONOCYTES NFR BLD AUTO: 8.5 %
NEUTROPHILS # BLD AUTO: 2.82 K/UL
NEUTROPHILS NFR BLD AUTO: 54.4 %
PLATELET # BLD AUTO: 273 K/UL
POTASSIUM SERPL-SCNC: 4 MMOL/L
PROT SERPL-MCNC: 7.7 G/DL
RBC # BLD: 4.3 M/UL
RBC # FLD: 15 %
SARS-COV-2 IGG SERPL IA-ACNC: 0.08 INDEX
SARS-COV-2 IGG SERPL QL IA: NEGATIVE
SODIUM SERPL-SCNC: 145 MMOL/L
TRIGL SERPL-MCNC: 191 MG/DL
TSH SERPL-ACNC: 1.85 UIU/ML
WBC # FLD AUTO: 5.19 K/UL

## 2020-08-10 ENCOUNTER — OUTPATIENT (OUTPATIENT)
Dept: OUTPATIENT SERVICES | Facility: HOSPITAL | Age: 85
LOS: 1 days | End: 2020-08-10
Payer: COMMERCIAL

## 2020-08-10 ENCOUNTER — APPOINTMENT (OUTPATIENT)
Dept: ULTRASOUND IMAGING | Facility: CLINIC | Age: 85
End: 2020-08-10

## 2020-08-10 DIAGNOSIS — Z00.8 ENCOUNTER FOR OTHER GENERAL EXAMINATION: ICD-10-CM

## 2020-08-10 PROCEDURE — 93971 EXTREMITY STUDY: CPT | Mod: 26

## 2020-08-10 PROCEDURE — 93971 EXTREMITY STUDY: CPT

## 2020-08-11 DIAGNOSIS — M71.22 SYNOVIAL CYST OF POPLITEAL SPACE [BAKER], LEFT KNEE: ICD-10-CM

## 2020-08-12 DIAGNOSIS — H91.90 UNSPECIFIED HEARING LOSS, UNSPECIFIED EAR: ICD-10-CM

## 2020-08-24 ENCOUNTER — APPOINTMENT (OUTPATIENT)
Dept: ULTRASOUND IMAGING | Facility: CLINIC | Age: 85
End: 2020-08-24
Payer: MEDICARE

## 2020-08-24 ENCOUNTER — RESULT REVIEW (OUTPATIENT)
Age: 85
End: 2020-08-24

## 2020-08-24 ENCOUNTER — OUTPATIENT (OUTPATIENT)
Dept: OUTPATIENT SERVICES | Facility: HOSPITAL | Age: 85
LOS: 1 days | End: 2020-08-24
Payer: COMMERCIAL

## 2020-08-24 DIAGNOSIS — M71.22 SYNOVIAL CYST OF POPLITEAL SPACE [BAKER], LEFT KNEE: ICD-10-CM

## 2020-08-24 PROCEDURE — 20611 DRAIN/INJ JOINT/BURSA W/US: CPT

## 2020-08-24 PROCEDURE — 20611 DRAIN/INJ JOINT/BURSA W/US: CPT | Mod: LT

## 2020-10-07 ENCOUNTER — RX RENEWAL (OUTPATIENT)
Age: 85
End: 2020-10-07

## 2020-10-16 ENCOUNTER — APPOINTMENT (OUTPATIENT)
Dept: INTERNAL MEDICINE | Facility: CLINIC | Age: 85
End: 2020-10-16
Payer: MEDICARE

## 2020-10-16 PROCEDURE — 90662 IIV NO PRSV INCREASED AG IM: CPT

## 2020-10-16 PROCEDURE — G0008: CPT

## 2020-11-12 ENCOUNTER — NON-APPOINTMENT (OUTPATIENT)
Age: 85
End: 2020-11-12

## 2020-11-17 ENCOUNTER — NON-APPOINTMENT (OUTPATIENT)
Age: 85
End: 2020-11-17

## 2020-11-19 ENCOUNTER — NON-APPOINTMENT (OUTPATIENT)
Age: 85
End: 2020-11-19

## 2020-12-21 PROBLEM — J06.9 ACUTE UPPER RESPIRATORY INFECTION: Status: RESOLVED | Noted: 2019-11-07 | Resolved: 2020-12-21

## 2021-08-26 ENCOUNTER — TRANSCRIPTION ENCOUNTER (OUTPATIENT)
Age: 86
End: 2021-08-26

## 2021-08-31 ENCOUNTER — NON-APPOINTMENT (OUTPATIENT)
Age: 86
End: 2021-08-31

## 2021-08-31 ENCOUNTER — APPOINTMENT (OUTPATIENT)
Dept: INTERNAL MEDICINE | Facility: CLINIC | Age: 86
End: 2021-08-31
Payer: MEDICARE

## 2021-08-31 VITALS
DIASTOLIC BLOOD PRESSURE: 83 MMHG | TEMPERATURE: 97.3 F | OXYGEN SATURATION: 94 % | HEART RATE: 78 BPM | SYSTOLIC BLOOD PRESSURE: 152 MMHG

## 2021-08-31 DIAGNOSIS — D64.9 ANEMIA, UNSPECIFIED: ICD-10-CM

## 2021-08-31 DIAGNOSIS — Z92.29 PERSONAL HISTORY OF OTHER DRUG THERAPY: ICD-10-CM

## 2021-08-31 PROCEDURE — 99214 OFFICE O/P EST MOD 30 MIN: CPT

## 2021-08-31 RX ORDER — TRIAMCINOLONE ACETONIDE 1 MG/G
0.1 CREAM TOPICAL TWICE DAILY
Qty: 1 | Refills: 2 | Status: DISCONTINUED | COMMUNITY
Start: 2019-06-18 | End: 2021-08-31

## 2021-08-31 RX ORDER — PANTOPRAZOLE 20 MG/1
20 TABLET, DELAYED RELEASE ORAL
Qty: 90 | Refills: 3 | Status: DISCONTINUED | COMMUNITY
Start: 2020-03-05 | End: 2021-08-31

## 2021-08-31 RX ORDER — HYDROCODONE BITARTRATE AND HOMATROPINE METHYLBROMIDE 5; 1.5 MG/5ML; MG/5ML
5-1.5 SYRUP ORAL
Qty: 240 | Refills: 0 | Status: DISCONTINUED | COMMUNITY
Start: 2019-11-19 | End: 2021-08-31

## 2021-08-31 RX ORDER — BLOOD SUGAR DIAGNOSTIC
STRIP MISCELLANEOUS
Qty: 50 | Refills: 3 | Status: DISCONTINUED | COMMUNITY
Start: 2018-12-13 | End: 2021-08-31

## 2021-09-08 ENCOUNTER — APPOINTMENT (OUTPATIENT)
Dept: INTERNAL MEDICINE | Facility: CLINIC | Age: 86
End: 2021-09-08
Payer: MEDICARE

## 2021-09-08 ENCOUNTER — NON-APPOINTMENT (OUTPATIENT)
Age: 86
End: 2021-09-08

## 2021-09-08 VITALS
SYSTOLIC BLOOD PRESSURE: 139 MMHG | OXYGEN SATURATION: 96 % | DIASTOLIC BLOOD PRESSURE: 86 MMHG | WEIGHT: 125 LBS | TEMPERATURE: 98 F | BODY MASS INDEX: 25.2 KG/M2 | HEART RATE: 109 BPM | HEIGHT: 59 IN

## 2021-09-08 DIAGNOSIS — Z01.818 ENCOUNTER FOR OTHER PREPROCEDURAL EXAMINATION: ICD-10-CM

## 2021-09-08 LAB — HBA1C MFR BLD HPLC: 6.5

## 2021-09-08 PROCEDURE — 99212 OFFICE O/P EST SF 10 MIN: CPT | Mod: 25

## 2021-09-08 PROCEDURE — 93000 ELECTROCARDIOGRAM COMPLETE: CPT

## 2021-09-08 NOTE — PHYSICAL EXAM
[Normal] : no acute distress, well nourished, well developed and well-appearing [de-identified] : pt walks with quad walker, gait is tentative, uses wheelchair to get around the office.

## 2021-09-08 NOTE — HISTORY OF PRESENT ILLNESS
[FreeTextEntry1] : Needs EKG for preop. [de-identified] : Pt returns with preop forms, need hard copy of EKG. No new issues since last visit. Per son, is filling out form for HCP, he is designee, I signed as second witness.\par Pt did ask what she could do for arthritis in knees, has pain in bilat knees.

## 2021-09-08 NOTE — ASSESSMENT
[FreeTextEntry4] : Pt is low risk for low risk procedure. No further testing indicated. Pt may proceed with planned procedure, to continue her current meds.  [FreeTextEntry5] : N/A [FreeTextEntry6] : N/A [FreeTextEntry7] : continue as prescribed

## 2021-09-08 NOTE — HISTORY OF PRESENT ILLNESS
[Chronic Anticoagulation] : no chronic anticoagulation [Chronic Kidney Disease] : no chronic kidney disease [Diabetes] : no diabetes [FreeTextEntry1] : cataract [FreeTextEntry2] : 9/9/2021 [FreeTextEntry4] : 89 yo woman for cataract surgery evaluation. Pt does not have her forms wth her. H/o Macular degeneration/blindness, DJD/chronic pain, preDM, Colon ca, anemia, depression/anxiety. Pt reports overall doing well. Is in wheelchair but gets around with quad cane or walker at home. Has had prior surgeries of Partial Colectomy 2011, Hip replacement.2013, Cholecystectomy 2006. No issues with GA, bleeding, VTE, etc. No fam hx of same. \par  [FreeTextEntry5] : h/o pre-DM [FreeTextEntry8] : Pt does ADLs and some IADLs. Walks with walker. limited by arthritis.

## 2021-09-08 NOTE — REVIEW OF SYSTEMS
[FreeTextEntry2] : frustrated over loss of vision, cannot do all the activites (painting, sewing, etc) that she used to enjoy

## 2021-09-08 NOTE — RESULTS/DATA
[] : results reviewed [de-identified] : Sinus rhythm to sinus tachy (low 100's) with PACs vs sinus arrhythmia, no change from previous, benign findings [de-identified] : Prior results reviewed.

## 2021-10-06 ENCOUNTER — RX RENEWAL (OUTPATIENT)
Age: 86
End: 2021-10-06

## 2021-11-16 ENCOUNTER — APPOINTMENT (OUTPATIENT)
Dept: INTERNAL MEDICINE | Facility: CLINIC | Age: 86
End: 2021-11-16
Payer: MEDICARE

## 2021-11-16 VITALS
TEMPERATURE: 97.8 F | WEIGHT: 128 LBS | DIASTOLIC BLOOD PRESSURE: 86 MMHG | OXYGEN SATURATION: 95 % | SYSTOLIC BLOOD PRESSURE: 144 MMHG | BODY MASS INDEX: 25.8 KG/M2 | HEART RATE: 70 BPM | HEIGHT: 59 IN

## 2021-11-16 VITALS — DIASTOLIC BLOOD PRESSURE: 70 MMHG | SYSTOLIC BLOOD PRESSURE: 140 MMHG

## 2021-11-16 DIAGNOSIS — Z23 ENCOUNTER FOR IMMUNIZATION: ICD-10-CM

## 2021-11-16 PROCEDURE — G0439: CPT

## 2021-11-16 PROCEDURE — 90662 IIV NO PRSV INCREASED AG IM: CPT

## 2021-11-16 PROCEDURE — G0008: CPT

## 2021-11-16 NOTE — HEALTH RISK ASSESSMENT
[Fair] :  ~his/her~ mood as fair [No] : No [No falls in past year] : Patient reported no falls in the past year [0] : 2) Feeling down, depressed, or hopeless: Not at all (0) [] :  [Feels Safe at Home] : Feels safe at home [Fully functional (bathing, dressing, toileting, transferring, walking, feeding)] : Fully functional (bathing, dressing, toileting, transferring, walking, feeding) [Fully functional (using the telephone, shopping, preparing meals, housekeeping, doing laundry, using] : Fully functional and needs no help or supervision to perform IADLs (using the telephone, shopping, preparing meals, housekeeping, doing laundry, using transportation, managing medications and managing finances) [Patient declined mammogram] : Patient declined mammogram [Patient declined PAP Smear] : Patient declined PAP Smear [Patient declined bone density test] : Patient declined bone density test [Patient declined colonoscopy] : Patient declined colonoscopy [HIV test declined] : HIV test declined [Hepatitis C test declined] : Hepatitis C test declined [None] : None [With Family] : lives with family [Unemployed] : unemployed [With Patient/Caregiver] : , with patient/caregiver [] : No [de-identified] : Wheelchair [de-identified] : healthy [Change in mental status noted] : No change in mental status noted [Language] : denies difficulty with language [Sexually Active] : not sexually active [AdvancecareDate] : 11/16/21

## 2021-11-16 NOTE — HISTORY OF PRESENT ILLNESS
[FreeTextEntry1] : cpe [de-identified] : 89 yo woman w MD/blindness, DJD, preDM, Colon Ca. '11, depr/anx, anemia, chr pain lower back/knees, Wheelchair bound\par \par Main complaint is pain from arthritis, ashley lower back, knees. Didn't try Voltaren gel. \par Also very frustrated about the blindness, cannot read or see the food on her plate, can't look over old photos.\par Turned 89 this summer, was on cover of town paper, got card from Paul Brittany .\par Several of her sons have predeceased her in past few yrs. \par \par Pt had Covid vax x 2 is due for booster; Flu shot will do today\par Omepr for Gerd, unable to d/c\par Would like to d/c Mirtaz, takes 7,5 mg qhs, "fine without it".\par vision worsening, can see shapes, light/dark. \par BMD '19 was nl\par \par

## 2021-11-16 NOTE — PHYSICAL EXAM
[No Acute Distress] : no acute distress [Normal TMs] : both tympanic membranes were normal [Supple] : supple [Clear to Auscultation] : lungs were clear to auscultation bilaterally [Normal S1, S2] : normal S1 and S2 [No Edema] : there was no peripheral edema [Soft] : abdomen soft [Non Tender] : non-tender [Normal] : no CVA or spinal tenderness [No Rash] : no rash [No Focal Deficits] : no focal deficits [Alert and Oriented x3] : oriented to person, place, and time [de-identified] : Hard of hearing [de-identified] : blind [de-identified] : very sharp

## 2021-11-16 NOTE — ASSESSMENT
[FreeTextEntry1] : Pt as outlined.\par Hard of hearing, encouraged to pursue hearing aids.\par Flu shot today\par routine labs\par Covid booster at pharm\par Home P.T. to be set up if possible.\par

## 2021-11-19 ENCOUNTER — RX RENEWAL (OUTPATIENT)
Age: 86
End: 2021-11-19

## 2021-11-21 LAB
25(OH)D3 SERPL-MCNC: 47.8 NG/ML
ALBUMIN SERPL ELPH-MCNC: 4.1 G/DL
ALP BLD-CCNC: 54 U/L
ALT SERPL-CCNC: 11 U/L
ANION GAP SERPL CALC-SCNC: 13 MMOL/L
AST SERPL-CCNC: 13 U/L
BASOPHILS # BLD AUTO: 0.06 K/UL
BASOPHILS NFR BLD AUTO: 1 %
BILIRUB SERPL-MCNC: 0.3 MG/DL
BUN SERPL-MCNC: 19 MG/DL
CALCIUM SERPL-MCNC: 9.8 MG/DL
CHLORIDE SERPL-SCNC: 101 MMOL/L
CHOLEST SERPL-MCNC: 226 MG/DL
CO2 SERPL-SCNC: 26 MMOL/L
CREAT SERPL-MCNC: 0.77 MG/DL
EOSINOPHIL # BLD AUTO: 0.27 K/UL
EOSINOPHIL NFR BLD AUTO: 4.6 %
ESTIMATED AVERAGE GLUCOSE: 128 MG/DL
GLUCOSE SERPL-MCNC: 90 MG/DL
HBA1C MFR BLD HPLC: 6.1 %
HCT VFR BLD CALC: 42.2 %
HDLC SERPL-MCNC: 76 MG/DL
HGB BLD-MCNC: 12.8 G/DL
IMM GRANULOCYTES NFR BLD AUTO: 0.2 %
LDLC SERPL CALC-MCNC: 111 MG/DL
LYMPHOCYTES # BLD AUTO: 2.29 K/UL
LYMPHOCYTES NFR BLD AUTO: 38.7 %
MAN DIFF?: NORMAL
MCHC RBC-ENTMCNC: 29.6 PG
MCHC RBC-ENTMCNC: 30.3 GM/DL
MCV RBC AUTO: 97.7 FL
MONOCYTES # BLD AUTO: 0.55 K/UL
MONOCYTES NFR BLD AUTO: 9.3 %
NEUTROPHILS # BLD AUTO: 2.73 K/UL
NEUTROPHILS NFR BLD AUTO: 46.2 %
NONHDLC SERPL-MCNC: 150 MG/DL
PLATELET # BLD AUTO: 244 K/UL
POTASSIUM SERPL-SCNC: 4.1 MMOL/L
PROT SERPL-MCNC: 7.8 G/DL
RBC # BLD: 4.32 M/UL
RBC # FLD: 14.6 %
SODIUM SERPL-SCNC: 140 MMOL/L
TRIGL SERPL-MCNC: 193 MG/DL
TSH SERPL-ACNC: 1.38 UIU/ML
WBC # FLD AUTO: 5.91 K/UL

## 2021-11-22 ENCOUNTER — NON-APPOINTMENT (OUTPATIENT)
Age: 86
End: 2021-11-22

## 2022-01-10 ENCOUNTER — APPOINTMENT (OUTPATIENT)
Dept: INTERNAL MEDICINE | Facility: CLINIC | Age: 87
End: 2022-01-10
Payer: MEDICARE

## 2022-01-10 DIAGNOSIS — K21.9 GASTRO-ESOPHAGEAL REFLUX DISEASE W/OUT ESOPHAGITIS: ICD-10-CM

## 2022-01-10 PROCEDURE — 99442: CPT

## 2022-01-11 ENCOUNTER — RX RENEWAL (OUTPATIENT)
Age: 87
End: 2022-01-11

## 2022-01-21 ENCOUNTER — RX RENEWAL (OUTPATIENT)
Age: 87
End: 2022-01-21

## 2022-01-31 ENCOUNTER — RX RENEWAL (OUTPATIENT)
Age: 87
End: 2022-01-31

## 2022-01-31 RX ORDER — LANSOPRAZOLE 15 MG/1
15 CAPSULE, DELAYED RELEASE ORAL
Qty: 180 | Refills: 0 | Status: DISCONTINUED | COMMUNITY
Start: 2022-01-21 | End: 2022-01-31

## 2022-01-31 RX ORDER — DEXLANSOPRAZOLE 30 MG/1
30 CAPSULE, DELAYED RELEASE ORAL DAILY
Qty: 30 | Refills: 5 | Status: DISCONTINUED | COMMUNITY
Start: 2022-01-20 | End: 2022-01-31

## 2022-01-31 RX ORDER — FAMOTIDINE 20 MG/1
20 TABLET, FILM COATED ORAL DAILY
Qty: 90 | Refills: 3 | Status: DISCONTINUED | COMMUNITY
Start: 2022-01-10 | End: 2022-01-31

## 2022-01-31 RX ORDER — ESOMEPRAZOLE MAGNESIUM 40 MG/1
40 CAPSULE, DELAYED RELEASE ORAL DAILY
Qty: 30 | Refills: 3 | Status: DISCONTINUED | COMMUNITY
Start: 2022-01-20 | End: 2022-01-31

## 2022-02-03 ENCOUNTER — APPOINTMENT (OUTPATIENT)
Dept: INTERNAL MEDICINE | Facility: CLINIC | Age: 87
End: 2022-02-03
Payer: MEDICARE

## 2022-02-03 VITALS
BODY MASS INDEX: 24.84 KG/M2 | TEMPERATURE: 98.4 F | WEIGHT: 123 LBS | SYSTOLIC BLOOD PRESSURE: 151 MMHG | DIASTOLIC BLOOD PRESSURE: 80 MMHG | OXYGEN SATURATION: 97 % | HEART RATE: 88 BPM

## 2022-02-03 DIAGNOSIS — R26.89 OTHER ABNORMALITIES OF GAIT AND MOBILITY: ICD-10-CM

## 2022-02-03 PROCEDURE — 99213 OFFICE O/P EST LOW 20 MIN: CPT

## 2022-02-03 NOTE — HISTORY OF PRESENT ILLNESS
[FreeTextEntry1] : f/u [de-identified] : Poor vision, worsening\par chr pain, ashley legs/knees/shins\par  blindness, poor appetite\par Ibu for pain\par Belching on higher dose of omepr \par needs hearing aids\par poor balance

## 2022-02-03 NOTE — PHYSICAL EXAM
[No Acute Distress] : no acute distress [No Lymphadenopathy] : no lymphadenopathy [Clear to Auscultation] : lungs were clear to auscultation bilaterally [Normal S1, S2] : normal S1 and S2 [No Edema] : there was no peripheral edema

## 2022-02-03 NOTE — ASSESSMENT
[FreeTextEntry1] : Pt as outlined.\par Will order p.T.\par Declines anything stronger for pain at this time.\par Encouraged to seek out resources for the blind.\par contin omepr 40\par hearing aids\par f/u 3 mos

## 2022-05-03 ENCOUNTER — APPOINTMENT (OUTPATIENT)
Dept: INTERNAL MEDICINE | Facility: CLINIC | Age: 87
End: 2022-05-03
Payer: MEDICARE

## 2022-05-03 VITALS
WEIGHT: 128 LBS | HEART RATE: 99 BPM | SYSTOLIC BLOOD PRESSURE: 185 MMHG | HEIGHT: 59 IN | BODY MASS INDEX: 25.8 KG/M2 | DIASTOLIC BLOOD PRESSURE: 96 MMHG | TEMPERATURE: 97.9 F | OXYGEN SATURATION: 95 %

## 2022-05-03 DIAGNOSIS — R20.9 UNSPECIFIED DISTURBANCES OF SKIN SENSATION: ICD-10-CM

## 2022-05-03 PROCEDURE — 99214 OFFICE O/P EST MOD 30 MIN: CPT

## 2022-05-03 NOTE — PHYSICAL EXAM
[No Acute Distress] : no acute distress [Supple] : supple [Clear to Auscultation] : lungs were clear to auscultation bilaterally [Normal S1, S2] : normal S1 and S2 [No Edema] : there was no peripheral edema [Non Tender] : non-tender [de-identified] : feet are warm and pulses are nl (pt states they feel cold).

## 2022-05-03 NOTE — ASSESSMENT
[FreeTextEntry1] : Pt is stable.\par We discussed whether she wants something stronger for pain, she declines for now.\par Check routine labs\par contin current regimen\par f/u 3-4 mos\par

## 2022-05-03 NOTE — HISTORY OF PRESENT ILLNESS
[FreeTextEntry1] : f/u [de-identified] : c/o arthritis pain, Ibuprof helps. P.T. did not help.\par Poor sleeping, not every night- if uses Ibu at night this helps. \par c/o cold feet, nl color and not swollen.\par Cannot see,makes eating less enjoyable. Optho uptodate.\par Cannot hear, hasn't wanted a hearing aid ($$)\par Iron 3x/wk\par Ambulates around house, no falls, uses walker. Son is with her. Has a chair lift up sstairs.\par \par Covid vax x 3 no 4th yet

## 2022-05-04 LAB
25(OH)D3 SERPL-MCNC: 47.9 NG/ML
ALBUMIN SERPL ELPH-MCNC: 4.1 G/DL
ALP BLD-CCNC: 66 U/L
ALT SERPL-CCNC: 10 U/L
ANION GAP SERPL CALC-SCNC: 12 MMOL/L
AST SERPL-CCNC: 16 U/L
BASOPHILS # BLD AUTO: 0.08 K/UL
BASOPHILS NFR BLD AUTO: 1.5 %
BILIRUB SERPL-MCNC: 0.2 MG/DL
BUN SERPL-MCNC: 22 MG/DL
CALCIUM SERPL-MCNC: 9.5 MG/DL
CHLORIDE SERPL-SCNC: 104 MMOL/L
CHOLEST SERPL-MCNC: 225 MG/DL
CO2 SERPL-SCNC: 26 MMOL/L
CREAT SERPL-MCNC: 0.85 MG/DL
EGFR: 61 ML/MIN/1.73M2
EOSINOPHIL # BLD AUTO: 0.23 K/UL
EOSINOPHIL NFR BLD AUTO: 4.3 %
ESTIMATED AVERAGE GLUCOSE: 134 MG/DL
GLUCOSE SERPL-MCNC: 123 MG/DL
HBA1C MFR BLD HPLC: 6.3 %
HCT VFR BLD CALC: 41.1 %
HDLC SERPL-MCNC: 73 MG/DL
HGB BLD-MCNC: 12.8 G/DL
IMM GRANULOCYTES NFR BLD AUTO: 0.2 %
LDLC SERPL CALC-MCNC: 105 MG/DL
LYMPHOCYTES # BLD AUTO: 1.87 K/UL
LYMPHOCYTES NFR BLD AUTO: 35 %
MAN DIFF?: NORMAL
MCHC RBC-ENTMCNC: 29.8 PG
MCHC RBC-ENTMCNC: 31.1 GM/DL
MCV RBC AUTO: 95.6 FL
MONOCYTES # BLD AUTO: 0.49 K/UL
MONOCYTES NFR BLD AUTO: 9.2 %
NEUTROPHILS # BLD AUTO: 2.67 K/UL
NEUTROPHILS NFR BLD AUTO: 49.8 %
NONHDLC SERPL-MCNC: 152 MG/DL
PLATELET # BLD AUTO: 224 K/UL
POTASSIUM SERPL-SCNC: 4.3 MMOL/L
PROT SERPL-MCNC: 7.7 G/DL
RBC # BLD: 4.3 M/UL
RBC # FLD: 13.9 %
SODIUM SERPL-SCNC: 142 MMOL/L
TRIGL SERPL-MCNC: 232 MG/DL
TSH SERPL-ACNC: 2.27 UIU/ML
VIT B12 SERPL-MCNC: 426 PG/ML
WBC # FLD AUTO: 5.35 K/UL

## 2022-07-01 RX ORDER — DICLOFENAC SODIUM 1% 10 MG/G
1 GEL TOPICAL DAILY
Qty: 1 | Refills: 0 | Status: ACTIVE | COMMUNITY
Start: 2022-07-01 | End: 1900-01-01

## 2022-07-12 ENCOUNTER — APPOINTMENT (OUTPATIENT)
Dept: INTERNAL MEDICINE | Facility: CLINIC | Age: 87
End: 2022-07-12

## 2022-07-12 VITALS
TEMPERATURE: 98.1 F | HEIGHT: 59 IN | DIASTOLIC BLOOD PRESSURE: 89 MMHG | SYSTOLIC BLOOD PRESSURE: 161 MMHG | HEART RATE: 92 BPM | BODY MASS INDEX: 25.6 KG/M2 | WEIGHT: 127 LBS | OXYGEN SATURATION: 93 %

## 2022-07-12 PROCEDURE — 99214 OFFICE O/P EST MOD 30 MIN: CPT

## 2022-07-12 NOTE — HISTORY OF PRESENT ILLNESS
[FreeTextEntry8] : 89 yo F, reports 2 days of feeling like she cannot take a deep breath, breathing shallow and fast. No fever or cough, not eating well but this is not new. Taking advil 2 tabs twice daily for arthritis pain, given oxycodone, made her dizzy, dose report diclofenac gel helps. Did not COVID test.

## 2022-07-12 NOTE — PHYSICAL EXAM
[No Acute Distress] : no acute distress [Well-Appearing] : well-appearing [No Lymphadenopathy] : no lymphadenopathy [Supple] : supple [No Accessory Muscle Use] : no accessory muscle use [Clear to Auscultation] : lungs were clear to auscultation bilaterally [Normal Percussion] : the chest was normal to percussion [Normal] : affect was normal and insight and judgment were intact [No Edema] : there was no peripheral edema [de-identified] : mild obvious tachypnea; no egophony [de-identified] : no palpable calf tenderness bilat [de-identified] : no axillary adenopathy

## 2022-07-13 LAB
BASOPHILS # BLD AUTO: 0.06 K/UL
BASOPHILS NFR BLD AUTO: 1.1 %
EOSINOPHIL # BLD AUTO: 0.18 K/UL
EOSINOPHIL NFR BLD AUTO: 3.4 %
HCT VFR BLD CALC: 38.7 %
HGB BLD-MCNC: 12.4 G/DL
IMM GRANULOCYTES NFR BLD AUTO: 0.4 %
LYMPHOCYTES # BLD AUTO: 1.7 K/UL
LYMPHOCYTES NFR BLD AUTO: 32.1 %
MAN DIFF?: NORMAL
MCHC RBC-ENTMCNC: 29.5 PG
MCHC RBC-ENTMCNC: 32 GM/DL
MCV RBC AUTO: 92.1 FL
MONOCYTES # BLD AUTO: 0.55 K/UL
MONOCYTES NFR BLD AUTO: 10.4 %
NEUTROPHILS # BLD AUTO: 2.78 K/UL
NEUTROPHILS NFR BLD AUTO: 52.6 %
PLATELET # BLD AUTO: 221 K/UL
RBC # BLD: 4.2 M/UL
RBC # FLD: 14.1 %
WBC # FLD AUTO: 5.29 K/UL

## 2022-07-19 ENCOUNTER — NON-APPOINTMENT (OUTPATIENT)
Age: 87
End: 2022-07-19

## 2022-07-29 ENCOUNTER — NON-APPOINTMENT (OUTPATIENT)
Age: 87
End: 2022-07-29

## 2022-07-31 ENCOUNTER — NON-APPOINTMENT (OUTPATIENT)
Age: 87
End: 2022-07-31

## 2022-08-01 ENCOUNTER — APPOINTMENT (OUTPATIENT)
Dept: INTERNAL MEDICINE | Facility: CLINIC | Age: 87
End: 2022-08-01

## 2022-08-01 ENCOUNTER — NON-APPOINTMENT (OUTPATIENT)
Age: 87
End: 2022-08-01

## 2022-08-01 ENCOUNTER — INPATIENT (INPATIENT)
Facility: HOSPITAL | Age: 87
LOS: 1 days | Discharge: HOME | DRG: 309 | End: 2022-08-03
Attending: STUDENT IN AN ORGANIZED HEALTH CARE EDUCATION/TRAINING PROGRAM | Admitting: STUDENT IN AN ORGANIZED HEALTH CARE EDUCATION/TRAINING PROGRAM
Payer: COMMERCIAL

## 2022-08-01 VITALS
HEIGHT: 58 IN | SYSTOLIC BLOOD PRESSURE: 116 MMHG | WEIGHT: 126.99 LBS | HEART RATE: 104 BPM | OXYGEN SATURATION: 96 % | DIASTOLIC BLOOD PRESSURE: 78 MMHG | TEMPERATURE: 97 F | RESPIRATION RATE: 17 BRPM

## 2022-08-01 VITALS
BODY MASS INDEX: 25.8 KG/M2 | HEART RATE: 75 BPM | WEIGHT: 128 LBS | DIASTOLIC BLOOD PRESSURE: 85 MMHG | TEMPERATURE: 97.2 F | HEIGHT: 59 IN | SYSTOLIC BLOOD PRESSURE: 155 MMHG | OXYGEN SATURATION: 97 %

## 2022-08-01 VITALS — HEART RATE: 170 BPM

## 2022-08-01 DIAGNOSIS — N17.9 ACUTE KIDNEY FAILURE, UNSPECIFIED: ICD-10-CM

## 2022-08-01 DIAGNOSIS — Z29.9 ENCOUNTER FOR PROPHYLACTIC MEASURES, UNSPECIFIED: ICD-10-CM

## 2022-08-01 DIAGNOSIS — M19.90 UNSPECIFIED OSTEOARTHRITIS, UNSPECIFIED SITE: ICD-10-CM

## 2022-08-01 DIAGNOSIS — R06.02 SHORTNESS OF BREATH: ICD-10-CM

## 2022-08-01 DIAGNOSIS — I48.91 UNSPECIFIED ATRIAL FIBRILLATION: ICD-10-CM

## 2022-08-01 LAB
ALBUMIN SERPL ELPH-MCNC: 4 G/DL — SIGNIFICANT CHANGE UP (ref 3.3–5)
ALP SERPL-CCNC: 56 U/L — SIGNIFICANT CHANGE UP (ref 40–120)
ALT FLD-CCNC: 10 U/L — SIGNIFICANT CHANGE UP (ref 10–45)
ANION GAP SERPL CALC-SCNC: 15 MMOL/L — SIGNIFICANT CHANGE UP (ref 5–17)
AST SERPL-CCNC: 16 U/L — SIGNIFICANT CHANGE UP (ref 10–40)
BASOPHILS # BLD AUTO: 0.06 K/UL — SIGNIFICANT CHANGE UP (ref 0–0.2)
BASOPHILS NFR BLD AUTO: 1 % — SIGNIFICANT CHANGE UP (ref 0–2)
BILIRUB SERPL-MCNC: 0.2 MG/DL — SIGNIFICANT CHANGE UP (ref 0.2–1.2)
BUN SERPL-MCNC: 30 MG/DL — HIGH (ref 7–23)
CALCIUM SERPL-MCNC: 9.2 MG/DL — SIGNIFICANT CHANGE UP (ref 8.4–10.5)
CHLORIDE SERPL-SCNC: 106 MMOL/L — SIGNIFICANT CHANGE UP (ref 96–108)
CO2 SERPL-SCNC: 23 MMOL/L — SIGNIFICANT CHANGE UP (ref 22–31)
CREAT SERPL-MCNC: 1.33 MG/DL — HIGH (ref 0.5–1.3)
EGFR: 36 ML/MIN/1.73M2 — LOW
EOSINOPHIL # BLD AUTO: 0.08 K/UL — SIGNIFICANT CHANGE UP (ref 0–0.5)
EOSINOPHIL NFR BLD AUTO: 1.3 % — SIGNIFICANT CHANGE UP (ref 0–6)
FLUAV AG NPH QL: SIGNIFICANT CHANGE UP
FLUBV AG NPH QL: SIGNIFICANT CHANGE UP
GLUCOSE SERPL-MCNC: 192 MG/DL — HIGH (ref 70–99)
HCT VFR BLD CALC: 39.9 % — SIGNIFICANT CHANGE UP (ref 34.5–45)
HGB BLD-MCNC: 12.5 G/DL — SIGNIFICANT CHANGE UP (ref 11.5–15.5)
IMM GRANULOCYTES NFR BLD AUTO: 0.3 % — SIGNIFICANT CHANGE UP (ref 0–1.5)
LYMPHOCYTES # BLD AUTO: 1.41 K/UL — SIGNIFICANT CHANGE UP (ref 1–3.3)
LYMPHOCYTES # BLD AUTO: 23 % — SIGNIFICANT CHANGE UP (ref 13–44)
MAGNESIUM SERPL-MCNC: 1.8 MG/DL — SIGNIFICANT CHANGE UP (ref 1.6–2.6)
MCHC RBC-ENTMCNC: 29.3 PG — SIGNIFICANT CHANGE UP (ref 27–34)
MCHC RBC-ENTMCNC: 31.3 GM/DL — LOW (ref 32–36)
MCV RBC AUTO: 93.7 FL — SIGNIFICANT CHANGE UP (ref 80–100)
MONOCYTES # BLD AUTO: 0.5 K/UL — SIGNIFICANT CHANGE UP (ref 0–0.9)
MONOCYTES NFR BLD AUTO: 8.2 % — SIGNIFICANT CHANGE UP (ref 2–14)
NEUTROPHILS # BLD AUTO: 4.06 K/UL — SIGNIFICANT CHANGE UP (ref 1.8–7.4)
NEUTROPHILS NFR BLD AUTO: 66.2 % — SIGNIFICANT CHANGE UP (ref 43–77)
NRBC # BLD: 0 /100 WBCS — SIGNIFICANT CHANGE UP (ref 0–0)
NT-PROBNP SERPL-SCNC: 3602 PG/ML — HIGH (ref 0–300)
PLATELET # BLD AUTO: 197 K/UL — SIGNIFICANT CHANGE UP (ref 150–400)
POTASSIUM SERPL-MCNC: 4 MMOL/L — SIGNIFICANT CHANGE UP (ref 3.5–5.3)
POTASSIUM SERPL-SCNC: 4 MMOL/L — SIGNIFICANT CHANGE UP (ref 3.5–5.3)
PROT SERPL-MCNC: 7.5 G/DL — SIGNIFICANT CHANGE UP (ref 6–8.3)
RBC # BLD: 4.26 M/UL — SIGNIFICANT CHANGE UP (ref 3.8–5.2)
RBC # FLD: 14.5 % — SIGNIFICANT CHANGE UP (ref 10.3–14.5)
RSV RNA NPH QL NAA+NON-PROBE: SIGNIFICANT CHANGE UP
SARS-COV-2 RNA SPEC QL NAA+PROBE: SIGNIFICANT CHANGE UP
SODIUM SERPL-SCNC: 144 MMOL/L — SIGNIFICANT CHANGE UP (ref 135–145)
T4 FREE SERPL-MCNC: 1.1 NG/DL — SIGNIFICANT CHANGE UP (ref 0.9–1.8)
TROPONIN T, HIGH SENSITIVITY RESULT: 24 NG/L — SIGNIFICANT CHANGE UP (ref 0–51)
TROPONIN T, HIGH SENSITIVITY RESULT: 30 NG/L — SIGNIFICANT CHANGE UP (ref 0–51)
TSH SERPL-MCNC: 1.56 UIU/ML — SIGNIFICANT CHANGE UP (ref 0.27–4.2)
WBC # BLD: 6.13 K/UL — SIGNIFICANT CHANGE UP (ref 3.8–10.5)
WBC # FLD AUTO: 6.13 K/UL — SIGNIFICANT CHANGE UP (ref 3.8–10.5)

## 2022-08-01 PROCEDURE — 93010 ELECTROCARDIOGRAM REPORT: CPT | Mod: 76

## 2022-08-01 PROCEDURE — 99223 1ST HOSP IP/OBS HIGH 75: CPT | Mod: GC

## 2022-08-01 PROCEDURE — 99291 CRITICAL CARE FIRST HOUR: CPT

## 2022-08-01 PROCEDURE — 99214 OFFICE O/P EST MOD 30 MIN: CPT

## 2022-08-01 PROCEDURE — 71045 X-RAY EXAM CHEST 1 VIEW: CPT | Mod: 26

## 2022-08-01 RX ORDER — MIRTAZAPINE 45 MG/1
0.5 TABLET, ORALLY DISINTEGRATING ORAL
Qty: 0 | Refills: 0 | DISCHARGE

## 2022-08-01 RX ORDER — OMEPRAZOLE 10 MG/1
1 CAPSULE, DELAYED RELEASE ORAL
Qty: 0 | Refills: 0 | DISCHARGE

## 2022-08-01 RX ORDER — DILTIAZEM HCL 120 MG
10 CAPSULE, EXT RELEASE 24 HR ORAL ONCE
Refills: 0 | Status: DISCONTINUED | OUTPATIENT
Start: 2022-08-01 | End: 2022-08-01

## 2022-08-01 RX ORDER — MIRTAZAPINE 45 MG/1
15 TABLET, ORALLY DISINTEGRATING ORAL AT BEDTIME
Refills: 0 | Status: DISCONTINUED | OUTPATIENT
Start: 2022-08-01 | End: 2022-08-03

## 2022-08-01 RX ORDER — HEPARIN SODIUM 5000 [USP'U]/ML
5000 INJECTION INTRAVENOUS; SUBCUTANEOUS EVERY 8 HOURS
Refills: 0 | Status: DISCONTINUED | OUTPATIENT
Start: 2022-08-01 | End: 2022-08-03

## 2022-08-01 RX ORDER — SODIUM CHLORIDE 9 MG/ML
1000 INJECTION, SOLUTION INTRAVENOUS ONCE
Refills: 0 | Status: COMPLETED | OUTPATIENT
Start: 2022-08-01 | End: 2022-08-01

## 2022-08-01 RX ORDER — SODIUM CHLORIDE 9 MG/ML
1000 INJECTION, SOLUTION INTRAVENOUS
Refills: 0 | Status: DISCONTINUED | OUTPATIENT
Start: 2022-08-01 | End: 2022-08-03

## 2022-08-01 RX ORDER — METOPROLOL TARTRATE 50 MG
5 TABLET ORAL ONCE
Refills: 0 | Status: COMPLETED | OUTPATIENT
Start: 2022-08-01 | End: 2022-08-01

## 2022-08-01 RX ORDER — METOPROLOL TARTRATE 50 MG
12.5 TABLET ORAL EVERY 12 HOURS
Refills: 0 | Status: DISCONTINUED | OUTPATIENT
Start: 2022-08-01 | End: 2022-08-03

## 2022-08-01 RX ORDER — HEPARIN SODIUM 5000 [USP'U]/ML
5000 INJECTION INTRAVENOUS; SUBCUTANEOUS EVERY 12 HOURS
Refills: 0 | Status: DISCONTINUED | OUTPATIENT
Start: 2022-08-01 | End: 2022-08-01

## 2022-08-01 RX ORDER — PANTOPRAZOLE SODIUM 20 MG/1
40 TABLET, DELAYED RELEASE ORAL
Refills: 0 | Status: DISCONTINUED | OUTPATIENT
Start: 2022-08-01 | End: 2022-08-03

## 2022-08-01 RX ADMIN — SODIUM CHLORIDE 1000 MILLILITER(S): 9 INJECTION, SOLUTION INTRAVENOUS at 13:57

## 2022-08-01 RX ADMIN — HEPARIN SODIUM 5000 UNIT(S): 5000 INJECTION INTRAVENOUS; SUBCUTANEOUS at 22:16

## 2022-08-01 RX ADMIN — MIRTAZAPINE 15 MILLIGRAM(S): 45 TABLET, ORALLY DISINTEGRATING ORAL at 22:16

## 2022-08-01 RX ADMIN — SODIUM CHLORIDE 75 MILLILITER(S): 9 INJECTION, SOLUTION INTRAVENOUS at 23:29

## 2022-08-01 RX ADMIN — Medication 5 MILLIGRAM(S): at 11:54

## 2022-08-01 RX ADMIN — Medication 12.5 MILLIGRAM(S): at 18:38

## 2022-08-01 RX ADMIN — SODIUM CHLORIDE 75 MILLILITER(S): 9 INJECTION, SOLUTION INTRAVENOUS at 17:49

## 2022-08-01 RX ADMIN — Medication 5 MILLIGRAM(S): at 12:21

## 2022-08-01 NOTE — ED CLERICAL - NS ED CLERK NOTE PRE-ARRIVAL INFORMATION; ADDITIONAL PRE-ARRIVAL INFORMATION
CC/Reason For referral: sent for rapid heart rate and sob  Preferred Consultant(if applicable): na  Who admits for you (if needed): na  Do you have documents you would like to fax over? no  Would you still like to speak to an ED attending? yes

## 2022-08-01 NOTE — ED ADULT NURSE NOTE - NSICDXPASTMEDICALHX_GEN_ALL_CORE_FT
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

## 2022-08-01 NOTE — ED PROVIDER NOTE - CLINICAL SUMMARY MEDICAL DECISION MAKING FREE TEXT BOX
89F pmh DM who presents with new afib.  The differential diagnosis includes but is not limited to atrial fibrillation 2/2 age vs other cause.  Will get labs, likely admit.

## 2022-08-01 NOTE — ASSESSMENT
[FreeTextEntry1] : EKG rapid rate most c/w A.fib and/or SVT at 171\par \par Imp: Pt in SVT, symptomatic.\par D/w pt and son, needs to go to ER for eval/treatmt likely for rate control and anticoag.\par They are in agreement.\par ER triage called.\par

## 2022-08-01 NOTE — ED PROVIDER NOTE - OBJECTIVE STATEMENT
89F pmh DM who presents with 1 week SOB, new afib at physician office.  Elevated HR to 160s in ED.    Denies CP.

## 2022-08-01 NOTE — ED ADULT NURSE REASSESSMENT NOTE - NS ED NURSE REASSESS COMMENT FT1
Pt AxOx3, observed sitting up in stretcher conversing with RN without difficulty. Breathing spontaneous and unlabored on 2L NC w/ pulse ox >92%. Pt updated on plan of care awaiting bed assignment, RTM tele. As per ED Raul room, pt in and out of NSR and afib w/ rate controlled at 70-80's.  No acute distress noted. Call bell within reach.

## 2022-08-01 NOTE — H&P ADULT - NEGATIVE ENMT SYMPTOMS
no hearing difficulty/no ear pain/no sinus symptoms/no nasal congestion/no nasal discharge/no throat pain

## 2022-08-01 NOTE — ED PROVIDER NOTE - PROGRESS NOTE DETAILS
Patient received metoprolol 5mg IVP without improvement in rate, still sustaining 160's.  Diltiazem ordered.  Prior to administration patient urinated and broke to sinus.  Diltiazem cancelled.

## 2022-08-01 NOTE — H&P ADULT - ASSESSMENT
Ms. Peralta is a 89 yo F hx arthritis (non-ambulatory given severity, mostly wheelchair bound), GERD here after feeling SOB for 3 weeks, found to be in Afib with RVR, broken with vagal maneuver. Admitted for further workup/management.

## 2022-08-01 NOTE — H&P ADULT - PROBLEM SELECTOR PLAN 1
New onset, broken by vagal maneuver. Unclear etiology, low concern for ACS at this time (trop 24, no chest pain, no T wave abnormalities/dynamic changes on ekg ect). Also low concern for infection leading to afib.   -F/U repeat troponin  -Monitor on tele  -F/U TTE  -F/U TSH, monitor lytes  -Consider beta blocker (will hold off for now given ?heart block on ekg)  -Consider cardiology/EP consult. Pt will need cardiology followup on discharge.

## 2022-08-01 NOTE — H&P ADULT - HISTORY OF PRESENT ILLNESS
89 yo F hx arthritis (non-ambulatory given severity, mostly wheelchair bound), GERD here after feeling SOB for 3 weeks. Notes that she was feeling as though her breaths were shallow. She went to her PCP today, who noted afib with  on EKG, so pt sent to ER.   In the ED, pt was given lopressor x2 without response, but then afib broke while defecating. Currently, pt notes feeling better, and well overall. Denies fevers, chills, headaches, chest pain, SOB, dizziness/light-headedness, abd pain, n/v/d.  Ms. Peralta is a 89 yo F hx arthritis (non-ambulatory given severity, mostly wheelchair bound), GERD here after feeling SOB for 3 weeks. Notes that she was feeling as though her breaths were shallow. She went to her PCP today, who noted afib with  on EKG, so pt sent to ER.   In the ED, pt was given lopressor x2 without response, but then afib broke while defecating. Currently, pt notes feeling better, and well overall. Denies fevers, chills, headaches, chest pain, SOB, dizziness/light-headedness, abd pain, n/v/d.

## 2022-08-01 NOTE — ED PROVIDER NOTE - PHYSICAL EXAMINATION
Vital signs reviewed.  CONSTITUTIONAL: Well appearing in NAD  HEAD: Normocephalic; atraumatic  NECK: Trachea midline  CV: Normal S1, S2; extremities WWP  RESP: normal work of breathing; no stridor  ABD: soft, non-distended; non-tender  MSK/EXT: 2+ edema b/l, no deformities  SKIN: Warm and dry as visualized  NEURO: A&O, appears non-focal  Psych: Appropriate mood and affect

## 2022-08-01 NOTE — H&P ADULT - ATTENDING COMMENTS
89F w/pmh arthritis p/w new onset paroxysmal atrial fibrillation with RVR. Went back into SR while in the ER. Start on PO metoprolol, monitor on telemetry. Obtain echocardiogram, check TSH, consult EP. Discussed with patient and son about possibility of starting anticoagulation given stroke risk, they will consider and decide whether they want to start this medication. No hx of GI bleeding - had partial colectomy in the past though due to ?polyps. Unclear etiology of MARZENA, but got 1L IVF so far, f/u repeat in the AM.

## 2022-08-01 NOTE — H&P ADULT - NSHPLABSRESULTS_GEN_ALL_CORE
12.5   6.13  )-----------( 197      ( 01 Aug 2022 12:13 )             39.9       08-01    144  |  106  |  30<H>  ----------------------------<  192<H>  4.0   |  23  |  1.33<H>    Ca    9.2      01 Aug 2022 12:13  Mg     1.8     08-01    TPro  7.5  /  Alb  4.0  /  TBili  0.2  /  DBili  x   /  AST  16  /  ALT  10  /  AlkPhos  56  08-01                      Lactate Trend            CAPILLARY BLOOD GLUCOSE      POCT Blood Glucose.: 172 mg/dL (01 Aug 2022 11:50)

## 2022-08-01 NOTE — H&P ADULT - NSICDXPASTMEDICALHX_GEN_ALL_CORE_FT
PAST MEDICAL HISTORY:  Age Related Macular Degeneration     Colon Cancer dx on colonoscopy 1 week ago    Diabetes Borderline, not on meds    Legally blind     Osteoarthritis     Vitamin D deficiency

## 2022-08-01 NOTE — ED ADULT NURSE NOTE - OBJECTIVE STATEMENT
Pt 89 y/o female, AxOX3, presents to ED from PCP office for abnormal EKG. Pt states she has experienced SOB x 1 week, presented to MD office and found to be in new afib. Pt is well appearing, speaking full sentences without difficulty. Breathing spontaneous and unlabored with pulse ox 90% on room air. Placed on 2L NC at this time w/ improvement. Upon assessment, abdomen soft and nontender, +strong peripheral pulses, moving all extremities without difficulty, lungs clear. Pt placed on continuous pulse ox and cardiac monitor, afib noted w/ 's.  Safety and comfort measures initiated- bed placed in lowest position and side rails raised. Pt oriented to call bell system.

## 2022-08-01 NOTE — PROVIDER CONTACT NOTE (OTHER) - BACKGROUND
Pt is admitted for atrial fibrillation. PMH of legally blind, Vitamin D deficiency, diabetes, osteoarthritis

## 2022-08-01 NOTE — PHYSICAL EXAM
[Supple] : supple [Clear to Auscultation] : lungs were clear to auscultation bilaterally [de-identified] : obvious SOB, new [de-identified] : irreg irreg rapid HR [de-identified] : calf tenderness bilat [de-identified] : bump on left foot

## 2022-08-01 NOTE — HISTORY OF PRESENT ILLNESS
[FreeTextEntry8] : c/o 3 wks' SOB/ breathlessness. She was attrib to anxiety, feels it goes away when distracted but is mosty there.\par No CP. Does have pain in calves which is not new per pt.\par BP running high lately, is not on anti-htn meds.\par Mostly WC bound, not ambulatory due to severe DJD. \par Was here recently, CBC was fine, CXR ok but aorta "dilated and calcified".\par No recent Covid. \par Recently dropped a magnifying glass onto right foot, son wants to make sure no fx.\par

## 2022-08-01 NOTE — ED PROVIDER NOTE - NSICDXPASTSURGICALHX_GEN_ALL_CORE_FT
PAST SURGICAL HISTORY:  History of Cholecystectomy 2007. Laproscopic    S/P Hernia Repair umbilical  2007    S/P Tonsillectomy and Adenoidectomy age 3

## 2022-08-01 NOTE — H&P ADULT - PROBLEM SELECTOR PLAN 2
Baseline 0.85 in May of this year. 1.33 on admission. Likely pre-renal in setting of afib, poor po intake while not feeling well.   -S/P 1L LR in ED   -C/W IVF  -Trend Cr. If not improving will work up further with UA, urine lytes ect  -Avoid nephrotoxic medications

## 2022-08-01 NOTE — ED PROVIDER NOTE - ATTENDING CONTRIBUTION TO CARE
Patient complaining of multiple days of dyspnea found to be A fib sustaining rates in 160s-170s.  No prior diagnosis of A fib not on AC.  Denying associated chest pains.  Aside from tachycardia non diagnostic exam.  Will start rate control, obtain labs, admission for further workup.

## 2022-08-02 ENCOUNTER — TRANSCRIPTION ENCOUNTER (OUTPATIENT)
Age: 87
End: 2022-08-02

## 2022-08-02 LAB
ALBUMIN SERPL ELPH-MCNC: 3.8 G/DL — SIGNIFICANT CHANGE UP (ref 3.3–5)
ALP SERPL-CCNC: 59 U/L — SIGNIFICANT CHANGE UP (ref 40–120)
ALT FLD-CCNC: 24 U/L — SIGNIFICANT CHANGE UP (ref 10–45)
ANION GAP SERPL CALC-SCNC: 11 MMOL/L — SIGNIFICANT CHANGE UP (ref 5–17)
AST SERPL-CCNC: 35 U/L — SIGNIFICANT CHANGE UP (ref 10–40)
BILIRUB SERPL-MCNC: 0.4 MG/DL — SIGNIFICANT CHANGE UP (ref 0.2–1.2)
BUN SERPL-MCNC: 29 MG/DL — HIGH (ref 7–23)
CALCIUM SERPL-MCNC: 9.1 MG/DL — SIGNIFICANT CHANGE UP (ref 8.4–10.5)
CHLORIDE SERPL-SCNC: 105 MMOL/L — SIGNIFICANT CHANGE UP (ref 96–108)
CO2 SERPL-SCNC: 27 MMOL/L — SIGNIFICANT CHANGE UP (ref 22–31)
CREAT SERPL-MCNC: 1.15 MG/DL — SIGNIFICANT CHANGE UP (ref 0.5–1.3)
EGFR: 43 ML/MIN/1.73M2 — LOW
GLUCOSE SERPL-MCNC: 115 MG/DL — HIGH (ref 70–99)
HCT VFR BLD CALC: 34.9 % — SIGNIFICANT CHANGE UP (ref 34.5–45)
HGB BLD-MCNC: 10.9 G/DL — LOW (ref 11.5–15.5)
MAGNESIUM SERPL-MCNC: 1.9 MG/DL — SIGNIFICANT CHANGE UP (ref 1.6–2.6)
MCHC RBC-ENTMCNC: 29.3 PG — SIGNIFICANT CHANGE UP (ref 27–34)
MCHC RBC-ENTMCNC: 31.2 GM/DL — LOW (ref 32–36)
MCV RBC AUTO: 93.8 FL — SIGNIFICANT CHANGE UP (ref 80–100)
NRBC # BLD: 0 /100 WBCS — SIGNIFICANT CHANGE UP (ref 0–0)
PHOSPHATE SERPL-MCNC: 3.8 MG/DL — SIGNIFICANT CHANGE UP (ref 2.5–4.5)
PLATELET # BLD AUTO: 151 K/UL — SIGNIFICANT CHANGE UP (ref 150–400)
POTASSIUM SERPL-MCNC: 3.9 MMOL/L — SIGNIFICANT CHANGE UP (ref 3.5–5.3)
POTASSIUM SERPL-SCNC: 3.9 MMOL/L — SIGNIFICANT CHANGE UP (ref 3.5–5.3)
PROT SERPL-MCNC: 7.1 G/DL — SIGNIFICANT CHANGE UP (ref 6–8.3)
RBC # BLD: 3.72 M/UL — LOW (ref 3.8–5.2)
RBC # FLD: 14.6 % — HIGH (ref 10.3–14.5)
SODIUM SERPL-SCNC: 143 MMOL/L — SIGNIFICANT CHANGE UP (ref 135–145)
WBC # BLD: 5.45 K/UL — SIGNIFICANT CHANGE UP (ref 3.8–10.5)
WBC # FLD AUTO: 5.45 K/UL — SIGNIFICANT CHANGE UP (ref 3.8–10.5)

## 2022-08-02 PROCEDURE — 93306 TTE W/DOPPLER COMPLETE: CPT | Mod: 26

## 2022-08-02 PROCEDURE — 99233 SBSQ HOSP IP/OBS HIGH 50: CPT | Mod: GC

## 2022-08-02 RX ADMIN — HEPARIN SODIUM 5000 UNIT(S): 5000 INJECTION INTRAVENOUS; SUBCUTANEOUS at 21:48

## 2022-08-02 RX ADMIN — HEPARIN SODIUM 5000 UNIT(S): 5000 INJECTION INTRAVENOUS; SUBCUTANEOUS at 17:08

## 2022-08-02 RX ADMIN — MIRTAZAPINE 15 MILLIGRAM(S): 45 TABLET, ORALLY DISINTEGRATING ORAL at 21:48

## 2022-08-02 RX ADMIN — HEPARIN SODIUM 5000 UNIT(S): 5000 INJECTION INTRAVENOUS; SUBCUTANEOUS at 05:11

## 2022-08-02 RX ADMIN — Medication 12.5 MILLIGRAM(S): at 17:07

## 2022-08-02 RX ADMIN — PANTOPRAZOLE SODIUM 40 MILLIGRAM(S): 20 TABLET, DELAYED RELEASE ORAL at 05:11

## 2022-08-02 RX ADMIN — Medication 12.5 MILLIGRAM(S): at 05:11

## 2022-08-02 NOTE — DISCHARGE NOTE PROVIDER - HOSPITAL COURSE
HPI:  Ms. Peralta is a 89 yo F hx arthritis (non-ambulatory given severity, mostly wheelchair bound), GERD here after feeling SOB for 3 weeks. Notes that she was feeling as though her breaths were shallow. She went to her PCP today, who noted afib with  on EKG, so pt sent to ER.   In the ED, pt was given lopressor x2 without response, but then afib broke while defecating. Currently, pt notes feeling better, and well overall. Denies fevers, chills, headaches, chest pain, SOB, dizziness/light-headedness, abd pain, n/v/d.  (01 Aug 2022 15:45)    Hospital course:    HPI:  Ms. Peralta is a 89 yo F hx arthritis (non-ambulatory given severity, mostly wheelchair bound), GERD here after feeling SOB for 3 weeks. Notes that she was feeling as though her breaths were shallow. She went to her PCP today, who noted afib with  on EKG, so pt sent to ER.   In the ED, pt was given lopressor x2 without response, but then afib broke while defecating. Currently, pt notes feeling better, and well overall. Denies fevers, chills, headaches, chest pain, SOB, dizziness/light-headedness, abd pain, n/v/d.  (01 Aug 2022 15:45)    Hospital course: Pt was monitored on tele and remained in sinus with sinus arrhythmia/PACs. Pt was started on metoprolol 12.5mg BID. Troponin was flat (24->30) without any dynamic EKG changes. TTE showed __________. Cardiology was consulted and recommended _____. Pt is stable and clear for dischare. She should follow up with her PCP and with cardiology within 1-2 weeks. HPI:  Ms. Peralta is a 89 yo F hx arthritis (non-ambulatory given severity, mostly wheelchair bound), GERD here after feeling SOB for 3 weeks. Notes that she was feeling as though her breaths were shallow. She went to her PCP today, who noted afib with  on EKG, so pt sent to ER.   In the ED, pt was given lopressor x2 without response, but then afib broke while defecating. Currently, pt notes feeling better, and well overall. Denies fevers, chills, headaches, chest pain, SOB, dizziness/light-headedness, abd pain, n/v/d.  (01 Aug 2022 15:45)    Hospital course: Pt was monitored on tele and remained in sinus with sinus arrhythmia/PACs. Pt was started on metoprolol 12.5mg BID. Troponin was flat (24->30) without any dynamic EKG changes. TTE showed Normal LV function, decreased RV function, no aortic stenosis, mild mitral regurgitation. Cardiology was consulted and recommended metoprolol xl 25mg daily and, in shared decision making with patient and family, eliquis 2.5mg BID. Pt is stable and clear for discharge. She should follow up with her PCP and with cardiology within 1-2 weeks.

## 2022-08-02 NOTE — PROGRESS NOTE ADULT - PROBLEM SELECTOR PLAN 1
New onset, broken by vagal maneuver. Unclear etiology, low concern for ACS at this time (trop 24, no chest pain, no T wave abnormalities/dynamic changes on ekg ect). Also low concern for infection leading to afib.   -Monitor on tele  -TTE 8/2; awaiting results  -TSH wnl  - started metoprolol 12.5 mg bid  - f/u cardio recs

## 2022-08-02 NOTE — CONSULT NOTE ADULT - SUBJECTIVE AND OBJECTIVE BOX
Date of Service :   08-02-22 @ 15:01  CHIEF COMPLAINT:Patient is a 89y old  Female who presents with a chief complaint of Fast heart rate (02 Aug 2022 12:48)      HISTORY OF PRESENT ILLNESS:HPI:  Ms. Peralta is a 89 yo F hx arthritis (non-ambulatory given severity, mostly wheelchair bound), GERD here after feeling SOB for 3 weeks. Notes that she was feeling as though her breaths were shallow. She went to her PCP today, who noted afib with  on EKG, so pt sent to ER.   In the ED, pt was given lopressor x2 without response, but then afib broke while defecating. Currently, pt notes feeling better, and well overall. Denies fevers, chills, headaches, chest pain, SOB, dizziness/light-headedness, abd pain, n/v/d.  (01 Aug 2022 15:45)      PAST MEDICAL & SURGICAL HISTORY:  Diabetes  Borderline, not on meds      Colon Cancer  dx on colonoscopy 1 week ago      Age Related Macular Degeneration      Osteoarthritis      Vitamin D deficiency      Legally blind      History of Cholecystectomy  2007. Laproscopic      S/P Hernia Repair  umbilical  2007      S/P Tonsillectomy and Adenoidectomy  age 3              MEDICATIONS:  heparin   Injectable 5000 Unit(s) SubCutaneous every 8 hours  metoprolol tartrate 12.5 milliGRAM(s) Oral every 12 hours        mirtazapine 15 milliGRAM(s) Oral at bedtime    pantoprazole    Tablet 40 milliGRAM(s) Oral before breakfast      lactated ringers. 1000 milliLiter(s) IV Continuous <Continuous>      FAMILY HISTORY:      Non-contributory    SOCIAL HISTORY:    [ ] Tobacco  [ ] Drugs  [ ] Alcohol    Allergies    No Known Allergies    Intolerances    	    REVIEW OF SYSTEMS:  CONSTITUTIONAL: No fever  EYES: No eye pain, visual disturbances, or discharge  ENMT:  No difficulty hearing, tinnitus  NECK: No pain or stiffness  RESPIRATORY: No cough, wheezing,  CARDIOVASCULAR: No chest pain, palpitations, passing out, dizziness, or leg swelling  GASTROINTESTINAL:  No nausea, vomiting, diarrhea or constipation. No melena.  GENITOURINARY: No dysuria, hematuria  NEUROLOGICAL: No stroke like symptoms  SKIN: No burning or lesions   ENDOCRINE: No heat or cold intolerance  MUSCULOSKELETAL: No joint pain or swelling  PSYCHIATRIC: No  anxiety, mood swings  HEME/LYMPH: No bleeding gums  ALLERGY AND IMMUNOLOGIC: No hives or eczema	    All other ROS negative    PHYSICAL EXAM:  T(C): 36.8 (08-02-22 @ 13:30), Max: 36.8 (08-02-22 @ 13:30)  HR: 81 (08-02-22 @ 13:30) (70 - 93)  BP: 107/73 (08-02-22 @ 13:30) (107/73 - 136/74)  RR: 18 (08-02-22 @ 13:30) (18 - 18)  SpO2: 91% (08-02-22 @ 13:30) (91% - 98%)  Wt(kg): --  I&O's Summary    01 Aug 2022 07:01  -  02 Aug 2022 07:00  --------------------------------------------------------  IN: 1250 mL / OUT: 0 mL / NET: 1250 mL        Appearance: Normal	  HEENT:   Normal oral mucosa, EOMI	  Cardiovascular:  S1 S2, No JVD,    Respiratory: Lungs clear to auscultation	  Psychiatry: Alert  Gastrointestinal:  Soft, Non-tender, + BS	  Skin: No rashes   Neurologic: Non-focal  Extremities:  No edema  Vascular: Peripheral pulses palpable    	    	  	  CARDIAC MARKERS:  Labs personally reviewed by me                                  10.9   5.45  )-----------( 151      ( 02 Aug 2022 07:23 )             34.9     08-02    143  |  105  |  29<H>  ----------------------------<  115<H>  3.9   |  27  |  1.15    Ca    9.1      02 Aug 2022 07:23  Phos  3.8     08-02  Mg     1.9     08-02    TPro  7.1  /  Alb  3.8  /  TBili  0.4  /  DBili  x   /  AST  35  /  ALT  24  /  AlkPhos  59  08-02          EKG: Personally reviewed by me -   Radiology: Personally reviewed by me -       Assessment /Plan:      Problem/Recommendation - 1:  ·  Problem      Dionne Link FN-BC   Ricardo Mauricio DO [facility]  Cardiovascular Medicine  [address] [place] [address], [address]  Office [phone]   Date of Service :   08-02-22 @ 15:01  CHIEF COMPLAINT:Patient is a 89y old  Female who presents with a chief complaint of Fast heart rate (02 Aug 2022 12:48)      HISTORY OF PRESENT ILLNESS:HPI:  Ms. Peralta is a 89 yo F hx arthritis (non-ambulatory given severity, mostly wheelchair bound), GERD here after feeling SOB for 3 weeks. Notes that she was feeling as though her breaths were shallow. She went to her PCP today, who noted afib with  on EKG, so pt sent to ER.   In the ED, pt was given lopressor x2 without response, but then afib broke while defecating. Currently, pt notes feeling better, and well overall. Denies fevers, chills, headaches, chest pain, SOB, dizziness/light-headedness, abd pain, n/v/d.  (01 Aug 2022 15:45)      PAST MEDICAL & SURGICAL HISTORY:  Diabetes  Borderline, not on meds      Colon Cancer  dx on colonoscopy 1 week ago      Age Related Macular Degeneration      Osteoarthritis      Vitamin D deficiency      Legally blind      History of Cholecystectomy  2007. Laproscopic      S/P Hernia Repair  umbilical  2007      S/P Tonsillectomy and Adenoidectomy  age 3              MEDICATIONS:  heparin   Injectable 5000 Unit(s) SubCutaneous every 8 hours  metoprolol tartrate 12.5 milliGRAM(s) Oral every 12 hours        mirtazapine 15 milliGRAM(s) Oral at bedtime    pantoprazole    Tablet 40 milliGRAM(s) Oral before breakfast      lactated ringers. 1000 milliLiter(s) IV Continuous <Continuous>      FAMILY HISTORY:      Non-contributory    SOCIAL HISTORY:    [ ] Tobacco  [ ] Drugs  [ ] Alcohol    Allergies    No Known Allergies    Intolerances    	    REVIEW OF SYSTEMS:  CONSTITUTIONAL: No fever  EYES: No eye pain, visual disturbances, or discharge  ENMT:  No difficulty hearing, tinnitus  NECK: No pain or stiffness  RESPIRATORY: No cough, wheezing,  CARDIOVASCULAR: No chest pain, palpitations, passing out, dizziness, or leg swelling  GASTROINTESTINAL:  No nausea, vomiting, diarrhea or constipation. No melena.  GENITOURINARY: No dysuria, hematuria  NEUROLOGICAL: No stroke like symptoms  SKIN: No burning or lesions   ENDOCRINE: No heat or cold intolerance  MUSCULOSKELETAL: No joint pain or swelling  PSYCHIATRIC: No  anxiety, mood swings  HEME/LYMPH: No bleeding gums  ALLERGY AND IMMUNOLOGIC: No hives or eczema	    All other ROS negative    PHYSICAL EXAM:  T(C): 36.8 (08-02-22 @ 13:30), Max: 36.8 (08-02-22 @ 13:30)  HR: 81 (08-02-22 @ 13:30) (70 - 93)  BP: 107/73 (08-02-22 @ 13:30) (107/73 - 136/74)  RR: 18 (08-02-22 @ 13:30) (18 - 18)  SpO2: 91% (08-02-22 @ 13:30) (91% - 98%)  Wt(kg): --  I&O's Summary    01 Aug 2022 07:01  -  02 Aug 2022 07:00  --------------------------------------------------------  IN: 1250 mL / OUT: 0 mL / NET: 1250 mL        Appearance: Normal	  HEENT:   Normal oral mucosa, EOMI	  Cardiovascular:  S1 S2, No JVD,    Respiratory: Lungs clear to auscultation	  Psychiatry: Alert  Gastrointestinal:  Soft, Non-tender, + BS	  Skin: No rashes   Neurologic: Non-focal  Extremities:  No edema  Vascular: Peripheral pulses palpable    	    	  	  CARDIAC MARKERS:  Labs personally reviewed by me                                  10.9   5.45  )-----------( 151      ( 02 Aug 2022 07:23 )             34.9     08-02    143  |  105  |  29<H>  ----------------------------<  115<H>  3.9   |  27  |  1.15    Ca    9.1      02 Aug 2022 07:23  Phos  3.8     08-02  Mg     1.9     08-02    TPro  7.1  /  Alb  3.8  /  TBili  0.4  /  DBili  x   /  AST  35  /  ALT  24  /  AlkPhos  59  08-02          EKG: Personally reviewed by me - 76 bpm SR pvc   Radiology: Personally reviewed by me -       Assessment /Plan:      Problem/Recommendation - 1:  ·  Problem      Dionne Link FN-BC   Ricardo Mauricio DO [facility]  Cardiovascular Medicine  [address] [place] [address], [address]  Office [phone]   Date of Service :   08-02-22 @ 15:01  CHIEF COMPLAINT:Patient is a 89y old  Female who presents with a chief complaint of Fast heart rate (02 Aug 2022 12:48)      HISTORY OF PRESENT ILLNESS:HPI:  Ms. Peralta is a 89 yo F hx arthritis (non-ambulatory given severity, mostly wheelchair bound), GERD here after feeling SOB for 3 weeks. Notes that she was feeling as though her breaths were shallow. She went to her PCP today, who noted afib with  on EKG, so pt sent to ER.   In the ED, pt was given lopressor x2 without response, but then afib broke while defecating. Currently, pt notes feeling better, and well overall. Denies fevers, chills, headaches, chest pain, SOB, dizziness/light-headedness, abd pain, n/v/d.  (01 Aug 2022 15:45)      PAST MEDICAL & SURGICAL HISTORY:  Diabetes  Borderline, not on meds      Colon Cancer  dx on colonoscopy 1 week ago      Age Related Macular Degeneration      Osteoarthritis      Vitamin D deficiency      Legally blind      History of Cholecystectomy  2007. Laproscopic      S/P Hernia Repair  umbilical  2007      S/P Tonsillectomy and Adenoidectomy  age 3              MEDICATIONS:  heparin   Injectable 5000 Unit(s) SubCutaneous every 8 hours  metoprolol tartrate 12.5 milliGRAM(s) Oral every 12 hours        mirtazapine 15 milliGRAM(s) Oral at bedtime    pantoprazole    Tablet 40 milliGRAM(s) Oral before breakfast      lactated ringers. 1000 milliLiter(s) IV Continuous <Continuous>      FAMILY HISTORY:      Non-contributory    SOCIAL HISTORY:    [ ] Tobacco  [ ] Drugs  [ ] Alcohol    Allergies    No Known Allergies    Intolerances    	    REVIEW OF SYSTEMS:  CONSTITUTIONAL: No fever  EYES: No eye pain, visual disturbances, or discharge  ENMT:  No difficulty hearing, tinnitus  NECK: No pain or stiffness  RESPIRATORY: No cough, wheezing,  CARDIOVASCULAR: No chest pain, palpitations, passing out, dizziness, or leg swelling  GASTROINTESTINAL:  No nausea, vomiting, diarrhea or constipation. No melena.  GENITOURINARY: No dysuria, hematuria  NEUROLOGICAL: No stroke like symptoms  SKIN: No burning or lesions   ENDOCRINE: No heat or cold intolerance  MUSCULOSKELETAL: No joint pain or swelling  PSYCHIATRIC: No  anxiety, mood swings  HEME/LYMPH: No bleeding gums  ALLERGY AND IMMUNOLOGIC: No hives or eczema	    All other ROS negative    PHYSICAL EXAM:  T(C): 36.8 (08-02-22 @ 13:30), Max: 36.8 (08-02-22 @ 13:30)  HR: 81 (08-02-22 @ 13:30) (70 - 93)  BP: 107/73 (08-02-22 @ 13:30) (107/73 - 136/74)  RR: 18 (08-02-22 @ 13:30) (18 - 18)  SpO2: 91% (08-02-22 @ 13:30) (91% - 98%)  Wt(kg): --  I&O's Summary    01 Aug 2022 07:01  -  02 Aug 2022 07:00  --------------------------------------------------------  IN: 1250 mL / OUT: 0 mL / NET: 1250 mL        Appearance: Normal	  HEENT:   Normal oral mucosa, EOMI	  Cardiovascular:  S1 S2, No JVD,    Respiratory: Lungs clear to auscultation	  Psychiatry: Alert  Gastrointestinal:  Soft, Non-tender, + BS	  Skin: No rashes   Neurologic: Non-focal  Extremities:  No edema  Vascular: Peripheral pulses palpable    	    	  	  CARDIAC MARKERS:  Labs personally reviewed by me                                  10.9   5.45  )-----------( 151      ( 02 Aug 2022 07:23 )             34.9     08-02    143  |  105  |  29<H>  ----------------------------<  115<H>  3.9   |  27  |  1.15    Ca    9.1      02 Aug 2022 07:23  Phos  3.8     08-02  Mg     1.9     08-02    TPro  7.1  /  Alb  3.8  /  TBili  0.4  /  DBili  x   /  AST  35  /  ALT  24  /  AlkPhos  59  08-02          EKG: Personally reviewed by me - 76 bpm SR pvc   Radiology: Personally reviewed by me -   < from: Xray Chest 1 View- PORTABLE-Urgent (08.01.22 @ 12:09) >  IMPRESSION:  Probable hiatal hernia.  No focal infiltrates.      < end of copied text >  < from: Transthoracic Echocardiogram (08.02.22 @ 15:07) >  EF (Visual Estimate): 60 %  Doppler Peak Velocity (m/sec): AoV=1.0    < end of copied text >  < from: Transthoracic Echocardiogram (08.02.22 @ 15:07) >  Conclusions:  Normal left ventricular systolic function. No segmental  wall motion abnormalities.  Right ventricular enlargement with decreased right  ventricular systolic function.  ------------------------------------------------------------------------  Confirmed on  8/2/2022 - 19:06:19 by Seven Miranda MD,  FRANK    < end of copied text >      Assessment /Plan:   Ms. Peralta is a 89 yo F hx arthritis (non-ambulatory given severity, mostly wheelchair bound), GERD here after feeling SOB for 3 weeks, found to be in Afib with RVR, broken with vagal maneuver. Admitted for further workup/management.   Pt denies any prior cardiac history other than HTN many years ago. Pt denies any hx of Afib and CAD or arrythmia. Denies any smoking history. Reports some increase in LUI.      Problem/Plan - 1:  ·  Problem: Atrial fibrillation.   - New onset, broken by vagal maneuver.  - Tele monitor   - c/w Metoprolol 12.5 mg  - now SR   - TTE as noted above shows EF of 60%, normal LVF and decreased RVF   - will discuss starting with AC with pt and son     Problem/Plan - 2:  ·  Problem: MARZENA (acute kidney injury).   ·  Plan: Baseline 0.85 in May of this year. 1.33 on admission. Likely pre-renal in setting of afib, poor po intake while not feeling well.   - S/P 1L LR in ED   - C/W IVF  - Avoid nephrotoxic medications  - creat WNL     Problem/Plan - 3:  ·  Problem: DVT ppx-heparin          Dionne Link FN-BC   Ricardo Mauricio DO [facility]  Cardiovascular Medicine  [address] [place] [address], [address]  Office [phone]   Date of Service :   08-02-22 @ 15:01  CHIEF COMPLAINT:Patient is a 89y old  Female who presents with a chief complaint of Fast heart rate (02 Aug 2022 12:48)      HISTORY OF PRESENT ILLNESS:HPI:  Ms. Peralta is a 89 yo F hx arthritis (non-ambulatory given severity, mostly wheelchair bound), GERD here after feeling SOB for 3 weeks. Notes that she was feeling as though her breaths were shallow. She went to her PCP today, who noted afib with  on EKG, so pt sent to ER.   In the ED, pt was given lopressor x2 without response, but then afib broke while defecating. Currently, pt notes feeling better, and well overall. Denies fevers, chills, headaches, chest pain, SOB, dizziness/light-headedness, abd pain, n/v/d.  (01 Aug 2022 15:45)      PAST MEDICAL & SURGICAL HISTORY:  Diabetes  Borderline, not on meds      Colon Cancer  dx on colonoscopy 1 week ago      Age Related Macular Degeneration      Osteoarthritis      Vitamin D deficiency      Legally blind      History of Cholecystectomy  2007. Laproscopic      S/P Hernia Repair  umbilical  2007      S/P Tonsillectomy and Adenoidectomy  age 3              MEDICATIONS:  heparin   Injectable 5000 Unit(s) SubCutaneous every 8 hours  metoprolol tartrate 12.5 milliGRAM(s) Oral every 12 hours        mirtazapine 15 milliGRAM(s) Oral at bedtime    pantoprazole    Tablet 40 milliGRAM(s) Oral before breakfast      lactated ringers. 1000 milliLiter(s) IV Continuous <Continuous>      FAMILY HISTORY:      Non-contributory    SOCIAL HISTORY:    [ ] not a smoker    Allergies    No Known Allergies    Intolerances    	    REVIEW OF SYSTEMS:  CONSTITUTIONAL: No fever  EYES: No eye pain, visual disturbances, or discharge  ENMT:  No difficulty hearing, tinnitus  NECK: No pain or stiffness  RESPIRATORY: No cough, wheezing,  CARDIOVASCULAR: No chest pain, palpitations, passing out, dizziness, or leg swelling  GASTROINTESTINAL:  No nausea, vomiting, diarrhea or constipation. No melena.  GENITOURINARY: No dysuria, hematuria  NEUROLOGICAL: No stroke like symptoms  SKIN: No burning or lesions   ENDOCRINE: No heat or cold intolerance  MUSCULOSKELETAL: No joint pain or swelling  PSYCHIATRIC: No  anxiety, mood swings  HEME/LYMPH: No bleeding gums  ALLERGY AND IMMUNOLOGIC: No hives or eczema	    All other ROS negative    PHYSICAL EXAM:  T(C): 36.8 (08-02-22 @ 13:30), Max: 36.8 (08-02-22 @ 13:30)  HR: 81 (08-02-22 @ 13:30) (70 - 93)  BP: 107/73 (08-02-22 @ 13:30) (107/73 - 136/74)  RR: 18 (08-02-22 @ 13:30) (18 - 18)  SpO2: 91% (08-02-22 @ 13:30) (91% - 98%)  Wt(kg): --  I&O's Summary    01 Aug 2022 07:01  -  02 Aug 2022 07:00  --------------------------------------------------------  IN: 1250 mL / OUT: 0 mL / NET: 1250 mL        Appearance: Normal	  HEENT:   Normal oral mucosa, EOMI	  Cardiovascular:  S1 S2, No JVD,    Respiratory: Lungs clear to auscultation	  Psychiatry: Alert  Gastrointestinal:  Soft, Non-tender, + BS	  Skin: No rashes   Neurologic: Non-focal  Extremities:  No edema  Vascular: Peripheral pulses palpable    	    	  	  CARDIAC MARKERS:  Labs personally reviewed by me                                  10.9   5.45  )-----------( 151      ( 02 Aug 2022 07:23 )             34.9     08-02    143  |  105  |  29<H>  ----------------------------<  115<H>  3.9   |  27  |  1.15    Ca    9.1      02 Aug 2022 07:23  Phos  3.8     08-02  Mg     1.9     08-02    TPro  7.1  /  Alb  3.8  /  TBili  0.4  /  DBili  x   /  AST  35  /  ALT  24  /  AlkPhos  59  08-02          EKG: Personally reviewed by me - 76 bpm SR pvc   Radiology: Personally reviewed by me -   < from: Xray Chest 1 View- PORTABLE-Urgent (08.01.22 @ 12:09) >  IMPRESSION:  Probable hiatal hernia.  No focal infiltrates.      < end of copied text >  < from: Transthoracic Echocardiogram (08.02.22 @ 15:07) >  EF (Visual Estimate): 60 %  Doppler Peak Velocity (m/sec): AoV=1.0    < end of copied text >  < from: Transthoracic Echocardiogram (08.02.22 @ 15:07) >  Conclusions:  Normal left ventricular systolic function. No segmental  wall motion abnormalities.  Right ventricular enlargement with decreased right  ventricular systolic function.  ------------------------------------------------------------------------  Confirmed on  8/2/2022 - 19:06:19 by Seven Miranda MD,  FRANK    < end of copied text >      Assessment /Plan:   Ms. Peralta is a 89 yo F hx arthritis (non-ambulatory given severity, mostly wheelchair bound), GERD here after feeling SOB for 3 weeks, found to be in Afib with RVR, broken with vagal maneuver. Admitted for further workup/management.   Pt denies any prior cardiac history other than HTN many years ago. Pt denies any hx of Afib and CAD or arrythmia. Denies any smoking history. Reports some increase in LUI.      Problem/Plan - 1:  ·  Problem: Atrial fibrillation.   - New onset, broken by vagal maneuver. Now in SR  - Tele monitor   - c/w Metoprolol 12.5 mg, transition to Toprol 25mg daily  - TTE as noted above shows EF of 60%, normal LVF and decreased RVF   - discussed risks and benefits of AC with pt and son at bedside, agreeable to start Eliquis 2.5mg BID for stroke prevention if blood loss anemia ruled out    Problem/Plan - 2:  ·  Problem: MARZENA (acute kidney injury).   ·  Plan: Baseline 0.85 in May of this year. 1.33 on admission. Likely pre-renal in setting of afib, poor po intake while not feeling well.   - S/P 1L LR in ED   - C/W IVF  - Avoid nephrotoxic medications  - creat WNL     Problem/Plan - 3:  ·  Problem: DVT ppx-heparin      Advanced care planning/advanced directives discussed with patient/family. DNR status including forceful chest compressions to attempt to restart the heart, ventilator support/artificial breathing, electric shock, artificial nutrition, health care proxy, Molst form all discussed with pt. Pt wishes to consider.  More than fifteen minutes spent on discussing advanced directives. OMT on six regions for acute somatic dysfunctions done at the bedside       Dionne Link [facility]   Ricardo Mauricio DO [facility]  Cardiovascular Medicine  [address] [place] [address], [address]  Office [phone]

## 2022-08-02 NOTE — DISCHARGE NOTE PROVIDER - NSDCFUSCHEDAPPT_GEN_ALL_CORE_FT
Josefa Urena Physician [facility]  Internal Med [address]  Scheduled Appointment: 09/08/2022

## 2022-08-02 NOTE — PROGRESS NOTE ADULT - SUBJECTIVE AND OBJECTIVE BOX
PROGRESS NOTE:      Patient is a 89y old  Female who presents with a chief complaint of Fast heart rate (02 Aug 2022 15:00)      SUBJECTIVE / OVERNIGHT EVENTS:   No acute events overnight. Pt denies CP, SOB, palpitations, n/v.     REVIEW OF SYSTEMS:    CONSTITUTIONAL: No weakness, fevers or chills  EYES/ENT: No visual changes;  No vertigo or throat pain   NECK: No pain or stiffness  RESPIRATORY: No cough, wheezing, hemoptysis; No shortness of breath  CARDIOVASCULAR: No chest pain or palpitations  GASTROINTESTINAL: No abdominal or epigastric pain. No nausea, vomiting, or hematemesis; No diarrhea or constipation. No melena or hematochezia.  GENITOURINARY: No dysuria, frequency or hematuria  NEUROLOGICAL: No numbness or weakness  SKIN: No itching, rashes    MEDICATIONS  (STANDING):  heparin   Injectable 5000 Unit(s) SubCutaneous every 8 hours  lactated ringers. 1000 milliLiter(s) (75 mL/Hr) IV Continuous <Continuous>  metoprolol tartrate 12.5 milliGRAM(s) Oral every 12 hours  mirtazapine 15 milliGRAM(s) Oral at bedtime  pantoprazole    Tablet 40 milliGRAM(s) Oral before breakfast    MEDICATIONS  (PRN):      CAPILLARY BLOOD GLUCOSE        I&O's Summary    01 Aug 2022 07:01  -  02 Aug 2022 07:00  --------------------------------------------------------  IN: 1250 mL / OUT: 0 mL / NET: 1250 mL        PHYSICAL EXAM:  Vital Signs Last 24 Hrs  T(C): 36.8 (02 Aug 2022 13:30), Max: 36.8 (02 Aug 2022 13:30)  T(F): 98.3 (02 Aug 2022 13:30), Max: 98.3 (02 Aug 2022 13:30)  HR: 81 (02 Aug 2022 13:30) (70 - 93)  BP: 107/73 (02 Aug 2022 13:30) (107/73 - 136/74)  BP(mean): --  RR: 18 (02 Aug 2022 13:30) (18 - 18)  SpO2: 91% (02 Aug 2022 13:30) (91% - 98%)    Parameters below as of 02 Aug 2022 13:30  Patient On (Oxygen Delivery Method): room air      CONSTITUTIONAL: Well-groomed, in no apparent distress  EYES: No conjunctival or scleral injection, non-icteric; PERRLA and symmetric  ENMT: No external nasal lesions; nasal mucosa not inflamed; normal dentition; no pharyngeal injection or exudates, oral mucosa with moist membranes  NECK: Trachea midline without palpable neck mass; thyroid not enlarged and non-tender  RESPIRATORY: Breathing comfortably; no dullness to percussion; lungs CTA without wheeze/rhonchi/rales  CARDIOVASCULAR: +S1S2, RRR, no M/G/R; no carotid bruits; pedal pulses full and symmetric; no lower extremity edema  CHEST/BREAST: Breasts are symmetric in appearance; no palpable masses or lumps  GASTROINTESTINAL: No palpable masses or tenderness, +BS throughout, no rebound/guarding; no hepatosplenomegaly; no hernia palpated  LYMPHATIC: No cervical LAD or tenderness; no axillary LAD or tenderness; no inguinal LAD or tenderness  MUSCULOSKELETAL: no digital clubbing or cyanosis; no paraspinal tenderness; normal strength and tone of extremities  SKIN: No rashes or ulcers noted; no subcutaneous nodules or induration palpable  NEUROLOGIC: CN II-XII intact; sensation intact in LEs b/l to light touch  PSYCHIATRIC: A+O x 3; mood and affect appropriate; appropriate insight and judgment    LABS:                        10.9   5.45  )-----------( 151      ( 02 Aug 2022 07:23 )             34.9     08-02    143  |  105  |  29<H>  ----------------------------<  115<H>  3.9   |  27  |  1.15    Ca    9.1      02 Aug 2022 07:23  Phos  3.8     08-02  Mg     1.9     08-02    TPro  7.1  /  Alb  3.8  /  TBili  0.4  /  DBili  x   /  AST  35  /  ALT  24  /  AlkPhos  59  08-02                RADIOLOGY & ADDITIONAL TESTS:  No new imaging or tests    COORDINATION OF CARE:  Care Discussed with Consultants/Other Providers [Y/N]:  Prior or Outpatient Records Reviewed [Y/N]:

## 2022-08-02 NOTE — DISCHARGE NOTE PROVIDER - NSDCCPCAREPLAN_GEN_ALL_CORE_FT
PRINCIPAL DISCHARGE DIAGNOSIS  Diagnosis: Atrial fibrillation  Assessment and Plan of Treatment:        PRINCIPAL DISCHARGE DIAGNOSIS  Diagnosis: Atrial fibrillation  Assessment and Plan of Treatment: You came into the hospital with atrial fibrillation, which is a rapid irregular heart rate. It is unclear what caused you to have this rapid heart rate. For some people, it occurs without a clear cause. This irregular heart rhythm normalized during your admission. You were started on a medication called metoprolol, which you should take as 25mg (one tablet) daily. You were also started on a blood thinner called eliquis, which you will take as one tablet (2.5mg) twice per day. This helps prevent stroke from your abnormal heart rhythm but does increase your risk of bleeding, so if you notice any abnormal bleeding including bleeding with bowel movements, you should hold your eliquis and consult your PCP. If this bleeding is associated with dizziness, passing out, chest pain, or other concerning symptoms, please return to the ED. You should follow up with your PCP and with cardiology within 1-2 weeks. If you experience worsening shortness of breath, palpitations, chest pain, pass out, or have other concerning symptoms, consult your PCP or return to the ED.

## 2022-08-02 NOTE — DISCHARGE NOTE PROVIDER - NSDCMRMEDTOKEN_GEN_ALL_CORE_FT
mirtazapine 15 mg oral tablet: 0.5 tab(s) orally once a day (at bedtime)  omeprazole 40 mg oral delayed release capsule: 1 cap(s) orally once a day   apixaban 2.5 mg oral tablet: 1 tab(s) orally every 12 hours  metoprolol succinate 25 mg oral tablet, extended release: 1 tab(s) orally once a day  mirtazapine 15 mg oral tablet: 0.5 tab(s) orally once a day (at bedtime)  omeprazole 40 mg oral delayed release capsule: 1 cap(s) orally once a day

## 2022-08-02 NOTE — DISCHARGE NOTE PROVIDER - PROVIDER RX CONTACT NUMBER
[phone]
no diplopia/no photophobia/no blurred vision L/no blurred vision R/no loss of vision L/no loss of vision R

## 2022-08-02 NOTE — DISCHARGE NOTE PROVIDER - CARE PROVIDER_API CALL
Josefa Urena)  Internal Medicine  [address] [place] [address], [address]  [place] [address]  Phone: [phone]  Fax: [phone]  Established Patient  Follow Up Time: 1 week

## 2022-08-03 ENCOUNTER — TRANSCRIPTION ENCOUNTER (OUTPATIENT)
Age: 87
End: 2022-08-03

## 2022-08-03 VITALS — WEIGHT: 132.28 LBS

## 2022-08-03 PROCEDURE — G0378: CPT

## 2022-08-03 PROCEDURE — 96374 THER/PROPH/DIAG INJ IV PUSH: CPT

## 2022-08-03 PROCEDURE — 84484 ASSAY OF TROPONIN QUANT: CPT

## 2022-08-03 PROCEDURE — 36415 COLL VENOUS BLD VENIPUNCTURE: CPT

## 2022-08-03 PROCEDURE — 84443 ASSAY THYROID STIM HORMONE: CPT

## 2022-08-03 PROCEDURE — 99239 HOSP IP/OBS DSCHRG MGMT >30: CPT | Mod: GC

## 2022-08-03 PROCEDURE — 83880 ASSAY OF NATRIURETIC PEPTIDE: CPT

## 2022-08-03 PROCEDURE — 82962 GLUCOSE BLOOD TEST: CPT

## 2022-08-03 PROCEDURE — 0241U: CPT

## 2022-08-03 PROCEDURE — 85027 COMPLETE CBC AUTOMATED: CPT

## 2022-08-03 PROCEDURE — 80053 COMPREHEN METABOLIC PANEL: CPT

## 2022-08-03 PROCEDURE — 96375 TX/PRO/DX INJ NEW DRUG ADDON: CPT

## 2022-08-03 PROCEDURE — 85025 COMPLETE CBC W/AUTO DIFF WBC: CPT

## 2022-08-03 PROCEDURE — 84439 ASSAY OF FREE THYROXINE: CPT

## 2022-08-03 PROCEDURE — 84100 ASSAY OF PHOSPHORUS: CPT

## 2022-08-03 PROCEDURE — 83735 ASSAY OF MAGNESIUM: CPT

## 2022-08-03 PROCEDURE — 71045 X-RAY EXAM CHEST 1 VIEW: CPT

## 2022-08-03 PROCEDURE — 99285 EMERGENCY DEPT VISIT HI MDM: CPT | Mod: 25

## 2022-08-03 PROCEDURE — 87637 SARSCOV2&INF A&B&RSV AMP PRB: CPT

## 2022-08-03 PROCEDURE — 93306 TTE W/DOPPLER COMPLETE: CPT

## 2022-08-03 RX ORDER — APIXABAN 2.5 MG/1
2.5 TABLET, FILM COATED ORAL EVERY 12 HOURS
Refills: 0 | Status: DISCONTINUED | OUTPATIENT
Start: 2022-08-03 | End: 2022-08-03

## 2022-08-03 RX ORDER — METOPROLOL TARTRATE 50 MG
25 TABLET ORAL DAILY
Refills: 0 | Status: DISCONTINUED | OUTPATIENT
Start: 2022-08-03 | End: 2022-08-03

## 2022-08-03 RX ORDER — METOPROLOL TARTRATE 50 MG
1 TABLET ORAL
Qty: 30 | Refills: 0
Start: 2022-08-03 | End: 2022-09-01

## 2022-08-03 RX ORDER — APIXABAN 2.5 MG/1
1 TABLET, FILM COATED ORAL
Qty: 60 | Refills: 0
Start: 2022-08-03 | End: 2022-09-01

## 2022-08-03 RX ORDER — CHLORHEXIDINE GLUCONATE 213 G/1000ML
1 SOLUTION TOPICAL
Refills: 0 | Status: DISCONTINUED | OUTPATIENT
Start: 2022-08-03 | End: 2022-08-03

## 2022-08-03 RX ADMIN — PANTOPRAZOLE SODIUM 40 MILLIGRAM(S): 20 TABLET, DELAYED RELEASE ORAL at 05:30

## 2022-08-03 RX ADMIN — HEPARIN SODIUM 5000 UNIT(S): 5000 INJECTION INTRAVENOUS; SUBCUTANEOUS at 05:28

## 2022-08-03 RX ADMIN — Medication 25 MILLIGRAM(S): at 05:30

## 2022-08-03 NOTE — DISCHARGE NOTE NURSING/CASE MANAGEMENT/SOCIAL WORK - PATIENT PORTAL LINK FT
The patient is a 47y Female complaining of flu-like symptoms.
You can access the FollowMyHealth Patient Portal offered by [facility] by registering at the following website: [email or web address]. By joining [facility]’s FollowMyHealth portal, you will also be able to view your health information using other applications (apps) compatible with our system.

## 2022-08-03 NOTE — PROGRESS NOTE ADULT - ASSESSMENT
Ms. Peralta is a 89 yo F hx arthritis (non-ambulatory given severity, mostly wheelchair bound), GERD here after feeling SOB for 3 weeks, found to be in Afib with RVR, broken with vagal maneuver. Admitted for further workup/management. 
Ms. Peralta is a 89 yo F hx arthritis (non-ambulatory given severity, mostly wheelchair bound), GERD here after feeling SOB for 3 weeks, found to be in Afib with RVR, broken with vagal maneuver. Admitted for further workup/management.

## 2022-08-03 NOTE — PATIENT PROFILE ADULT - FALL HARM RISK - HARM RISK INTERVENTIONS

## 2022-08-03 NOTE — PROGRESS NOTE ADULT - ATTENDING COMMENTS
89F w/pmh arthritis p/w new onset paroxysmal atrial fibrillation with RVR. Went back into SR while in the ER. Started on PO metoprolol, monitor on telemetry. Obtain echocardiogram. Spoke with cardiology (Dr. Mauricio) for consult. Discussed with patient and son about possibility of starting anticoagulation given stroke risk, they will consider and decide whether they want to start this medication. No hx of GI bleeding - had partial colectomy in the past though due to ?polyps.     Pre-renal MARZENA improving w/IVF.    Medically stable for discharge home today, pending cardiology recommendations. 35 minutes spent coordinating discharge.
89F w/pmh arthritis p/w new onset paroxysmal atrial fibrillation with RVR. Went back into SR while in the ER. Started on PO metoprolol, monitor on telemetry. Echo done, normal valves and EF. Cardiology consult appreciated. Patient started on eliquis 2.5mg bid for stroke prophylaxis.    Pre-renal MARZENA improving w/IVF.    Medically stable for discharge home today. 35 minutes spent coordinating discharge.

## 2022-08-03 NOTE — DISCHARGE NOTE NURSING/CASE MANAGEMENT/SOCIAL WORK - NSDCPEFALRISK_GEN_ALL_CORE
For information on Fall & Injury Prevention, visit: [email or web address] OR  [email or web address] OR  https://www.cdc.gov/steadi/patient.html

## 2022-08-03 NOTE — PROGRESS NOTE ADULT - SUBJECTIVE AND OBJECTIVE BOX
DATE OF SERVICE: 08-03-22 @ 12:32    Patient is a 89y old  Female who presents with a chief complaint of Fast heart rate (03 Aug 2022 07:26)      INTERVAL HISTORY: Feels good. Planned for DC today. Son at the bedside.     REVIEW OF SYSTEMS:  CONSTITUTIONAL: No weakness  EYES/ENT: No visual changes;  No throat pain   NECK: No pain or stiffness  RESPIRATORY: No cough, wheezing; No shortness of breath  CARDIOVASCULAR: No chest pain or palpitations  GASTROINTESTINAL: No abdominal  pain. No nausea, vomiting, or hematemesis  GENITOURINARY: No dysuria, frequency or hematuria  NEUROLOGICAL: No stroke like symptoms  SKIN: No rashes    TELEMETRY Personally reviewed: SR 70-80s, PACs, 2 sec PAT  	  MEDICATIONS:  metoprolol succinate ER 25 milliGRAM(s) Oral daily        PHYSICAL EXAM:  T(C): 36.4 (08-03-22 @ 04:29), Max: 36.8 (08-02-22 @ 13:30)  HR: 76 (08-03-22 @ 04:29) (65 - 81)  BP: 112/70 (08-03-22 @ 04:29) (107/73 - 126/76)  RR: 18 (08-03-22 @ 04:29) (18 - 19)  SpO2: 92% (08-03-22 @ 04:29) (91% - 93%)  Wt(kg): --  I&O's Summary    02 Aug 2022 07:01  -  03 Aug 2022 07:00  --------------------------------------------------------  IN: 0 mL / OUT: 250 mL / NET: -250 mL          Appearance: In no distress	  HEENT:    PERRL, EOMI	  Cardiovascular:  S1 S2, No JVD  Respiratory: Lungs clear to auscultation	  Gastrointestinal:  Soft, Non-tender, + BS	  Vascularature:  No edema of LE  Psychiatric: Appropriate affect   Neuro: no acute focal deficits                               10.9   5.45  )-----------( 151      ( 02 Aug 2022 07:23 )             34.9     08-02    143  |  105  |  29<H>  ----------------------------<  115<H>  3.9   |  27  |  1.15    Ca    9.1      02 Aug 2022 07:23  Phos  3.8     08-02  Mg     1.9     08-02    TPro  7.1  /  Alb  3.8  /  TBili  0.4  /  DBili  x   /  AST  35  /  ALT  24  /  AlkPhos  59  08-02        Labs personally reviewed      ASSESSMENT/PLAN: 	    Ms. Peralta is a 89 yo F hx arthritis (non-ambulatory given severity, mostly wheelchair bound), GERD here after feeling SOB for 3 weeks, found to be in Afib with RVR, broken with vagal maneuver. Admitted for further workup/management.   Pt denies any prior cardiac history other than HTN many years ago. Pt denies any hx of Afib and CAD or arrythmia. Denies any smoking history. Reports some increase in LUI.      Problem/Plan - 1:  ·  Problem: Atrial fibrillation.   - New onset, broken by vagal maneuver. Now in SR  - Tele monitor   - c/w Metoprolol 12.5 mg, transition to Toprol 25mg daily  - TTE as noted above shows EF of 60%, normal LVF and decreased RVF   - discussed risks and benefits of AC with pt and son at bedside, agreeable to start Eliquis 2.5mg BID for stroke prevention if blood loss anemia ruled out  - Eliquis started. To be followed by PCP, Dr. Urena and Cardiologist, Dr. Victoria.     Problem/Plan - 2:  ·  Problem: MARZENA (acute kidney injury).   ·  Plan: Baseline 0.85 in May of this year. 1.33 on admission. Likely pre-renal in setting of afib, poor po intake while not feeling well.   - S/P 1L LR in ED   - C/W IVF  - Avoid nephrotoxic medications  - creat WNL     Problem/Plan - 3:  ·  Problem: DVT ppx-heparin      Paulina Hui, AG-NP   Ricardo Mauricio DO [facility]  Cardiovascular Medicine  [address], [address]  Office: [phone]  Cell: [phone]

## 2022-08-03 NOTE — PROGRESS NOTE ADULT - SUBJECTIVE AND OBJECTIVE BOX
PROGRESS NOTE:     Patient is a 89y old  Female who presents with a chief complaint of Fast heart rate (03 Aug 2022 07:26)      SUBJECTIVE / OVERNIGHT EVENTS:     REVIEW OF SYSTEMS:    CONSTITUTIONAL: No weakness, fevers or chills  EYES/ENT: No visual changes;  No vertigo or throat pain   NECK: No pain or stiffness  RESPIRATORY: No cough, wheezing, hemoptysis; No shortness of breath  CARDIOVASCULAR: No chest pain or palpitations  GASTROINTESTINAL: No abdominal or epigastric pain. No nausea, vomiting, or hematemesis; No diarrhea or constipation. No melena or hematochezia.  GENITOURINARY: No dysuria, frequency or hematuria  NEUROLOGICAL: No numbness or weakness  SKIN: No itching, rashes      MEDICATIONS  (STANDING):  apixaban 2.5 milliGRAM(s) Oral every 12 hours  lactated ringers. 1000 milliLiter(s) (75 mL/Hr) IV Continuous <Continuous>  metoprolol succinate ER 25 milliGRAM(s) Oral daily  mirtazapine 15 milliGRAM(s) Oral at bedtime  pantoprazole    Tablet 40 milliGRAM(s) Oral before breakfast    MEDICATIONS  (PRN):      CAPILLARY BLOOD GLUCOSE        I&O's Summary    02 Aug 2022 07:01  -  03 Aug 2022 07:00  --------------------------------------------------------  IN: 0 mL / OUT: 250 mL / NET: -250 mL        PHYSICAL EXAM:  Vital Signs Last 24 Hrs  T(C): 36.4 (03 Aug 2022 04:29), Max: 36.8 (02 Aug 2022 13:30)  T(F): 97.6 (03 Aug 2022 04:29), Max: 98.3 (02 Aug 2022 13:30)  HR: 76 (03 Aug 2022 04:29) (65 - 81)  BP: 112/70 (03 Aug 2022 04:29) (107/73 - 126/76)  BP(mean): --  RR: 18 (03 Aug 2022 04:29) (18 - 19)  SpO2: 92% (03 Aug 2022 04:29) (91% - 93%)    Parameters below as of 03 Aug 2022 04:29  Patient On (Oxygen Delivery Method): room air      CONSTITUTIONAL: Well-groomed, in no apparent distress  EYES: No conjunctival or scleral injection, non-icteric; PERRLA and symmetric  ENMT: No external nasal lesions; nasal mucosa not inflamed; normal dentition; no pharyngeal injection or exudates, oral mucosa with moist membranes  NECK: Trachea midline without palpable neck mass; thyroid not enlarged and non-tender  RESPIRATORY: Breathing comfortably; no dullness to percussion; lungs CTA without wheeze/rhonchi/rales  CARDIOVASCULAR: +S1S2, RRR, no M/G/R; no carotid bruits; pedal pulses full and symmetric; no lower extremity edema  CHEST/BREAST: Breasts are symmetric in appearance; no palpable masses or lumps  GASTROINTESTINAL: No palpable masses or tenderness, +BS throughout, no rebound/guarding; no hepatosplenomegaly; no hernia palpated  LYMPHATIC: No cervical LAD or tenderness; no axillary LAD or tenderness; no inguinal LAD or tenderness  MUSCULOSKELETAL: no digital clubbing or cyanosis; no paraspinal tenderness; normal strength and tone of extremities  SKIN: No rashes or ulcers noted; no subcutaneous nodules or induration palpable  NEUROLOGIC: CN II-XII intact; sensation intact in LEs b/l to light touch  PSYCHIATRIC: A+O x 3; mood and affect appropriate; appropriate insight and judgment      LABS:                        10.9   5.45  )-----------( 151      ( 02 Aug 2022 07:23 )             34.9     08-02    143  |  105  |  29<H>  ----------------------------<  115<H>  3.9   |  27  |  1.15    Ca    9.1      02 Aug 2022 07:23  Phos  3.8     08-02  Mg     1.9     08-02    TPro  7.1  /  Alb  3.8  /  TBili  0.4  /  DBili  x   /  AST  35  /  ALT  24  /  AlkPhos  59  08-02                RADIOLOGY & ADDITIONAL TESTS:  No new imaging or tests    COORDINATION OF CARE:  Care Discussed with Consultants/Other Providers [Y/N]:  Prior or Outpatient Records Reviewed [Y/N]:   PROGRESS NOTE:     Patient is a 89y old  Female who presents with a chief complaint of Fast heart rate (03 Aug 2022 07:26)      SUBJECTIVE / OVERNIGHT EVENTS:   No acute events overnight. Pt A&Ox3. Denies CP, SOB, nausea, vomiting, abdominal pain.     REVIEW OF SYSTEMS:    CONSTITUTIONAL: No weakness, fevers or chills  EYES/ENT: No visual changes;  No vertigo or throat pain   NECK: No pain or stiffness  RESPIRATORY: No cough, wheezing, hemoptysis; No shortness of breath  CARDIOVASCULAR: No chest pain or palpitations  GASTROINTESTINAL: No abdominal or epigastric pain. No nausea, vomiting, or hematemesis; No diarrhea or constipation. No melena or hematochezia.  GENITOURINARY: No dysuria, frequency or hematuria  NEUROLOGICAL: No numbness or weakness  SKIN: No itching, rashes      MEDICATIONS  (STANDING):  apixaban 2.5 milliGRAM(s) Oral every 12 hours  lactated ringers. 1000 milliLiter(s) (75 mL/Hr) IV Continuous <Continuous>  metoprolol succinate ER 25 milliGRAM(s) Oral daily  mirtazapine 15 milliGRAM(s) Oral at bedtime  pantoprazole    Tablet 40 milliGRAM(s) Oral before breakfast    MEDICATIONS  (PRN):      CAPILLARY BLOOD GLUCOSE        I&O's Summary    02 Aug 2022 07:01  -  03 Aug 2022 07:00  --------------------------------------------------------  IN: 0 mL / OUT: 250 mL / NET: -250 mL        PHYSICAL EXAM:  Vital Signs Last 24 Hrs  T(C): 36.4 (03 Aug 2022 04:29), Max: 36.8 (02 Aug 2022 13:30)  T(F): 97.6 (03 Aug 2022 04:29), Max: 98.3 (02 Aug 2022 13:30)  HR: 76 (03 Aug 2022 04:29) (65 - 81)  BP: 112/70 (03 Aug 2022 04:29) (107/73 - 126/76)  BP(mean): --  RR: 18 (03 Aug 2022 04:29) (18 - 19)  SpO2: 92% (03 Aug 2022 04:29) (91% - 93%)    Parameters below as of 03 Aug 2022 04:29  Patient On (Oxygen Delivery Method): room air      CONSTITUTIONAL: Well-groomed, in no apparent distress  EYES: No conjunctival or scleral injection, non-icteric; PERRLA and symmetric  ENMT: No external nasal lesions; nasal mucosa not inflamed; normal dentition; no pharyngeal injection or exudates, oral mucosa with moist membranes  NECK: Trachea midline without palpable neck mass; thyroid not enlarged and non-tender  RESPIRATORY: Breathing comfortably; no dullness to percussion; lungs CTA without wheeze/rhonchi/rales  CARDIOVASCULAR: +S1S2, RRR, no M/G/R; no carotid bruits; pedal pulses full and symmetric; no lower extremity edema  CHEST/BREAST: Breasts are symmetric in appearance; no palpable masses or lumps  GASTROINTESTINAL: No palpable masses or tenderness, +BS throughout, no rebound/guarding; no hepatosplenomegaly; no hernia palpated  LYMPHATIC: No cervical LAD or tenderness; no axillary LAD or tenderness; no inguinal LAD or tenderness  MUSCULOSKELETAL: no digital clubbing or cyanosis; no paraspinal tenderness; normal strength and tone of extremities  SKIN: No rashes or ulcers noted; no subcutaneous nodules or induration palpable  NEUROLOGIC: CN II-XII intact; sensation intact in LEs b/l to light touch  PSYCHIATRIC: A+O x 3; mood and affect appropriate; appropriate insight and judgment      LABS:                        10.9   5.45  )-----------( 151      ( 02 Aug 2022 07:23 )             34.9     08-02    143  |  105  |  29<H>  ----------------------------<  115<H>  3.9   |  27  |  1.15    Ca    9.1      02 Aug 2022 07:23  Phos  3.8     08-02  Mg     1.9     08-02    TPro  7.1  /  Alb  3.8  /  TBili  0.4  /  DBili  x   /  AST  35  /  ALT  24  /  AlkPhos  59  08-02                RADIOLOGY & ADDITIONAL TESTS:  No new imaging or tests    COORDINATION OF CARE:  Care Discussed with Consultants/Other Providers [Y/N]:  Prior or Outpatient Records Reviewed [Y/N]:

## 2022-08-03 NOTE — PROGRESS NOTE ADULT - PROBLEM SELECTOR PLAN 2
Baseline 0.85 in May of this year. 1.33 on admission. Likely pre-renal in setting of afib, poor po intake while not feeling well.   -S/P 1L LR in ED   -C/W IVF  -Trend Cr. If not improving will work up further with UA, urine lytes ect  -Avoid nephrotoxic medications
Baseline 0.85 in May of this year. 1.33 on admission. Likely pre-renal in setting of afib, poor po intake while not feeling well.   -S/P 1L LR in ED   -C/W IVF  -Trend Cr. If not improving will work up further with UA, urine lytes ect  -Avoid nephrotoxic medications

## 2022-08-03 NOTE — PROGRESS NOTE ADULT - PROBLEM SELECTOR PLAN 1
New onset, broken by vagal maneuver. Unclear etiology, low concern for ACS at this time (trop 24, no chest pain, no T wave abnormalities/dynamic changes on ekg ect). Also low concern for infection leading to afib.   -Monitor on tele  -TTE 8/2; awaiting results  -TSH wnl  - started metoprolol 12.5 mg bid  - f/u cardio recs New onset, broken by vagal maneuver. Unclear etiology, low concern for ACS at this time (trop 24, no chest pain, no T wave abnormalities/dynamic changes on ekg ect). Also low concern for infection leading to afib.   -Monitor on tele  -TTE 8/2; EF 60%; normal LV function  -TSH wnl  - started metoprolol 12.5 mg bid  - per cardio, transition to Toprol 25mg and start eliquis 2.5mg bid

## 2022-08-05 ENCOUNTER — NON-APPOINTMENT (OUTPATIENT)
Age: 87
End: 2022-08-05

## 2022-08-09 ENCOUNTER — NON-APPOINTMENT (OUTPATIENT)
Age: 87
End: 2022-08-09

## 2022-08-15 ENCOUNTER — NON-APPOINTMENT (OUTPATIENT)
Age: 87
End: 2022-08-15

## 2022-08-16 ENCOUNTER — APPOINTMENT (OUTPATIENT)
Dept: INTERNAL MEDICINE | Facility: CLINIC | Age: 87
End: 2022-08-16

## 2022-08-16 VITALS
WEIGHT: 127 LBS | BODY MASS INDEX: 25.6 KG/M2 | HEIGHT: 59 IN | DIASTOLIC BLOOD PRESSURE: 83 MMHG | HEART RATE: 79 BPM | TEMPERATURE: 98 F | OXYGEN SATURATION: 94 % | SYSTOLIC BLOOD PRESSURE: 144 MMHG

## 2022-08-16 VITALS — HEART RATE: 80 BPM | OXYGEN SATURATION: 96 %

## 2022-08-16 DIAGNOSIS — I49.9 CARDIAC ARRHYTHMIA, UNSPECIFIED: ICD-10-CM

## 2022-08-16 PROCEDURE — 99495 TRANSJ CARE MGMT MOD F2F 14D: CPT

## 2022-08-16 NOTE — PHYSICAL EXAM
[No Acute Distress] : no acute distress [Supple] : supple [Clear to Auscultation] : lungs were clear to auscultation bilaterally [Normal Rate] : normal rate  [Normal S1, S2] : normal S1 and S2 [No Edema] : there was no peripheral edema [Alert and Oriented x3] : oriented to person, place, and time [66284 - Moderate Complexity requires multiple possible diagnoses and/or the management options, moderate complexity of the medical data (tests, etc.) to be reviewed, and moderate risk of significant complications, morbidity, and/or mortality as well as co] : Moderate Complexity [de-identified] : irreg rhythm

## 2022-08-16 NOTE — HISTORY OF PRESENT ILLNESS
[FreeTextEntry2] : Admission Date 01-Aug-2022 14:05\par Reason for Admission Fast heart rate\par Hospital Course \par HPI:\par Ms. Peralta is a 89 yo F hx arthritis (non-ambulatory given severity, mostly\par wheelchair bound), GERD here after feeling SOB for 3 weeks. Notes that she was\par feeling as though her breaths were shallow. She went to her PCP today, who\par noted afib with  on EKG, so pt sent to ER.\par In the ED, pt was given lopressor x2 without response, but then afib broke\par while defecating. Currently, pt notes feeling better, and well overall. Denies\par fevers, chills, headaches, chest pain, SOB, dizziness/light-headedness, abd\par pain, n/v/d. (01 Aug 2022 15:45)\par \par Hospital course: Pt was monitored on tele and remained in sinus with sinus\par arrhythmia/PACs. Pt was started on metoprolol 12.5mg BID. Troponin was flat\par (24->30) without any dynamic EKG changes. TTE showed Normal LV function,\par decreased RV function, no aortic stenosis, mild mitral regurgitation.\par Cardiology was consulted and recommended metoprolol xl 25mg daily and, in\par shared decision making with patient and family, eliquis 2.5mg BID. Pt is stable\par and clear for discharge. She should follow up with her PCP and with cardiology\par within 1-2 weeks.\par \par Med Reconciliation:\par Recommended Post-Discharge VTE Prophylaxis No post-discharge thromboprophylaxis\par indicated.\par Medication Reconciliation Status Admission Reconciliation is Completed\par \par Pt stable since home. No further breathlessness. Appetite returning. BMs nl.\par Eliquis was $250/2 mo suppl; pharm said this is best price to be expected.\par She is having pain in wrists , attribs to the new meds- Eliquis and Metopr- they looked it up..\par Areas of pain have obvious  bruising from needle puncture sites from IVs and bld draws in hosp, do not appear infected.\par Had bad experience in hosp- stuck 1.5 dd in ER hallway then finally got a bed shortly before ready for d/c.\par Did have trouble remembering if day/night as no windows and flourescent lighting. \par Uisng 2 Tylenol 2-3x/d which is holding her for arthritic pain, has Percocet but has not tried it.\par \par

## 2022-08-16 NOTE — ASSESSMENT
[FreeTextEntry1] : Pt as outlined.\par Recent admission for new a.fib w rapid rate, now rate-controlled. She converted to NSR in hosp but may be back in a.fib now.\par Advised to remain on current regimen.\par Advised Cardiol Dr. Norton.\par Contin anticoag for now, risk/benefit discussed.\par f/u one mo

## 2022-08-26 ENCOUNTER — NON-APPOINTMENT (OUTPATIENT)
Age: 87
End: 2022-08-26

## 2022-08-26 ENCOUNTER — APPOINTMENT (OUTPATIENT)
Dept: CARDIOLOGY | Facility: CLINIC | Age: 87
End: 2022-08-26

## 2022-08-26 VITALS
SYSTOLIC BLOOD PRESSURE: 142 MMHG | WEIGHT: 126 LBS | HEART RATE: 91 BPM | OXYGEN SATURATION: 96 % | BODY MASS INDEX: 25.45 KG/M2 | DIASTOLIC BLOOD PRESSURE: 70 MMHG

## 2022-08-26 DIAGNOSIS — F41.9 ANXIETY DISORDER, UNSPECIFIED: ICD-10-CM

## 2022-08-26 DIAGNOSIS — G89.29 LOW BACK PAIN, UNSPECIFIED: ICD-10-CM

## 2022-08-26 DIAGNOSIS — M54.50 LOW BACK PAIN, UNSPECIFIED: ICD-10-CM

## 2022-08-26 PROCEDURE — 93000 ELECTROCARDIOGRAM COMPLETE: CPT

## 2022-08-26 PROCEDURE — 99204 OFFICE O/P NEW MOD 45 MIN: CPT | Mod: 25

## 2022-08-26 NOTE — HISTORY OF PRESENT ILLNESS
[FreeTextEntry1] : 89-year-old female being seen in follow-up after a recent hospitalization for atrial fibrillation.  She is in good general health with no prior history of heart disease.  She been feeling weak and short of breath recently and was seen in her internist office on 8/1 where she was noted to be in atrial fibrillation with a heart rate of about 150.  She was sent to the hospital where she broke.  An echocardiogram showed normal left ventricular function, left atrial enlargement, mitral annular calcification, and a calcified aortic valve with normal valve function.  She was placed on metoprolol 25 twice daily and Eliquis 2.5 twice daily.\par \par She never had palpitations and is asymptomatic since discharge.  However, she dislikes taking medication.  She is upset about the cost of Eliquis and believes it is causing the arthritis in her hands to worsen.  She wants to stop or change to something else.\par \par She is alert.  However, her vision and mobility are both poor from macular degeneration and spinal stenosis.

## 2022-08-26 NOTE — DISCUSSION/SUMMARY
[FreeTextEntry1] : In summary, Mrs. Emmanuel is a 89-year old female who was recently diagnosed with paroxysmal atrial fibrillation.  She is asymptomatic.  Her exam showed regular rhythm, normal blood pressure, clear lungs, and a normal cardiac exam.  She was examined in a wheelchair.  Her EKG is within normal limits.\par \par She agreed to changing to a trial of Xarelto and was given samples of 50 mg/day to take for the next month.  She will continue on her metoprolol.  She will follow-up here in 4 months. [EKG obtained to assist in diagnosis and management of assessed problem(s)] : EKG obtained to assist in diagnosis and management of assessed problem(s)

## 2022-09-08 ENCOUNTER — APPOINTMENT (OUTPATIENT)
Dept: INTERNAL MEDICINE | Facility: CLINIC | Age: 87
End: 2022-09-08

## 2022-09-08 VITALS
TEMPERATURE: 98 F | DIASTOLIC BLOOD PRESSURE: 74 MMHG | SYSTOLIC BLOOD PRESSURE: 144 MMHG | HEART RATE: 72 BPM | BODY MASS INDEX: 25.6 KG/M2 | HEIGHT: 59 IN | WEIGHT: 127 LBS | OXYGEN SATURATION: 98 %

## 2022-09-08 PROCEDURE — 99213 OFFICE O/P EST LOW 20 MIN: CPT

## 2022-09-08 RX ORDER — DICLOFENAC SODIUM 1% 10 MG/G
1 GEL TOPICAL DAILY
Qty: 1 | Refills: 2 | Status: DISCONTINUED | COMMUNITY
Start: 2021-09-09 | End: 2022-09-08

## 2022-09-08 NOTE — PHYSICAL EXAM
[No Acute Distress] : no acute distress [Supple] : supple [Clear to Auscultation] : lungs were clear to auscultation bilaterally [Regular Rhythm] : with a regular rhythm [Normal S1, S2] : normal S1 and S2 [No Edema] : there was no peripheral edema

## 2022-09-08 NOTE — HISTORY OF PRESENT ILLNESS
[FreeTextEntry1] : f/u [de-identified] : Contin on Xarelto 15 mg /d, was given one mo supp by Cardiol.\par Now needs renewal. Cost will be $120/3 mo= $40/mo or ~ $1.50/d. Per son, they are able to afford it.\par She is asking for Coumadin since she knows others on it and it's $6.\par c/o arthritic pain. Boredom. Loneliness.  Poor appetite. \par Only Rao and Vick are left in the family. A isidra [place] calls monthly. Friends also can't drive or are dead.\par Has an aide for half day/wk via longterm care [employer].\par Med rec done. Tylenol for the pain. Percocet was too strong. \par

## 2022-09-08 NOTE — ASSESSMENT
[FreeTextEntry1] : Pt as outlined.\par Doesn't like being on Xarelto, pros and cons discussed, she agrees to stay on it.\par Add Celebrex for pain. \par Pall care consult, Dr. Zhou, son will see if amenable in the future. May be helpful for medical MJ/appetite /pain managmt.\par can d/c iron (not bothering her, prefers to stay on 3x/wk).\par f/u 3-4 mos

## 2022-09-27 ENCOUNTER — MED ADMIN CHARGE (OUTPATIENT)
Age: 87
End: 2022-09-27

## 2022-09-27 ENCOUNTER — APPOINTMENT (OUTPATIENT)
Dept: INTERNAL MEDICINE | Facility: CLINIC | Age: 87
End: 2022-09-27

## 2022-09-27 PROCEDURE — 90662 IIV NO PRSV INCREASED AG IM: CPT

## 2022-09-27 PROCEDURE — G0008: CPT

## 2022-12-30 ENCOUNTER — APPOINTMENT (OUTPATIENT)
Dept: CARDIOLOGY | Facility: CLINIC | Age: 87
End: 2022-12-30

## 2022-12-30 ENCOUNTER — NON-APPOINTMENT (OUTPATIENT)
Age: 87
End: 2022-12-30

## 2022-12-30 ENCOUNTER — APPOINTMENT (OUTPATIENT)
Dept: CARDIOLOGY | Facility: CLINIC | Age: 87
End: 2022-12-30
Payer: MEDICARE

## 2022-12-30 VITALS
BODY MASS INDEX: 26.05 KG/M2 | DIASTOLIC BLOOD PRESSURE: 80 MMHG | OXYGEN SATURATION: 96 % | HEART RATE: 76 BPM | WEIGHT: 129 LBS | SYSTOLIC BLOOD PRESSURE: 122 MMHG

## 2022-12-30 PROCEDURE — 36415 COLL VENOUS BLD VENIPUNCTURE: CPT

## 2022-12-30 PROCEDURE — 99214 OFFICE O/P EST MOD 30 MIN: CPT | Mod: 25

## 2022-12-30 PROCEDURE — 93000 ELECTROCARDIOGRAM COMPLETE: CPT

## 2022-12-30 NOTE — HISTORY OF PRESENT ILLNESS
[FreeTextEntry1] : 89-year-old female with a history of paroxysmal atrial fibrillation returns for follow-up.  She had an episode of atrial fibrillation in August which stopped spontaneously.  She has been on metoprolol and Xarelto ever since.  She has not had any recurrence of symptoms.  She continues to be bothered by poor vision and by poor mobility due to arthritis.  She continues to note some shortness of breath and her son reports he hears intermittent wheezing.

## 2022-12-30 NOTE — DISCUSSION/SUMMARY
[FreeTextEntry1] : Mrs. Emmanuel has not had any symptoms suggestive of recurrence of atrial fibrillation in the past 4 months.  Her exam shows occasional premature contractions, normal blood pressure, clear lungs, and a normal cardiac exam.  Her EKG shows APCs and is otherwise unremarkable.\par \par She is doing well and will continue on metoprolol 25 twice daily and Xarelto.  She will follow-up in 6 months. [EKG obtained to assist in diagnosis and management of assessed problem(s)] : EKG obtained to assist in diagnosis and management of assessed problem(s)

## 2023-01-03 LAB
ALBUMIN SERPL ELPH-MCNC: 4 G/DL
ALP BLD-CCNC: 50 U/L
ALT SERPL-CCNC: 12 U/L
ANION GAP SERPL CALC-SCNC: 10 MMOL/L
AST SERPL-CCNC: 18 U/L
BASOPHILS # BLD AUTO: 0.05 K/UL
BASOPHILS NFR BLD AUTO: 1.2 %
BILIRUB SERPL-MCNC: 0.3 MG/DL
BUN SERPL-MCNC: 19 MG/DL
CALCIUM SERPL-MCNC: 9.3 MG/DL
CHLORIDE SERPL-SCNC: 103 MMOL/L
CO2 SERPL-SCNC: 26 MMOL/L
CREAT SERPL-MCNC: 0.91 MG/DL
EGFR: 56 ML/MIN/1.73M2
EOSINOPHIL # BLD AUTO: 0.13 K/UL
EOSINOPHIL NFR BLD AUTO: 3.1 %
FERRITIN SERPL-MCNC: 256 NG/ML
GLUCOSE SERPL-MCNC: 148 MG/DL
HCT VFR BLD CALC: 38.6 %
HGB BLD-MCNC: 12 G/DL
IMM GRANULOCYTES NFR BLD AUTO: 0.2 %
IRON SATN MFR SERPL: 51 %
IRON SERPL-MCNC: 120 UG/DL
LYMPHOCYTES # BLD AUTO: 1.63 K/UL
LYMPHOCYTES NFR BLD AUTO: 38.3 %
MAN DIFF?: NORMAL
MCHC RBC-ENTMCNC: 30.3 PG
MCHC RBC-ENTMCNC: 31.1 GM/DL
MCV RBC AUTO: 97.5 FL
MONOCYTES # BLD AUTO: 0.43 K/UL
MONOCYTES NFR BLD AUTO: 10.1 %
NEUTROPHILS # BLD AUTO: 2.01 K/UL
NEUTROPHILS NFR BLD AUTO: 47.1 %
PLATELET # BLD AUTO: 231 K/UL
POTASSIUM SERPL-SCNC: 4.8 MMOL/L
PROT SERPL-MCNC: 7.6 G/DL
RBC # BLD: 3.96 M/UL
RBC # FLD: 13.7 %
SODIUM SERPL-SCNC: 140 MMOL/L
TIBC SERPL-MCNC: 237 UG/DL
UIBC SERPL-MCNC: 117 UG/DL
WBC # FLD AUTO: 4.26 K/UL

## 2023-01-11 ENCOUNTER — RX RENEWAL (OUTPATIENT)
Age: 88
End: 2023-01-11

## 2023-01-19 ENCOUNTER — APPOINTMENT (OUTPATIENT)
Dept: INTERNAL MEDICINE | Facility: CLINIC | Age: 88
End: 2023-01-19

## 2023-01-19 VITALS
HEIGHT: 59 IN | BODY MASS INDEX: 26.21 KG/M2 | DIASTOLIC BLOOD PRESSURE: 86 MMHG | SYSTOLIC BLOOD PRESSURE: 177 MMHG | WEIGHT: 130 LBS | HEART RATE: 68 BPM | OXYGEN SATURATION: 96 % | TEMPERATURE: 97.7 F

## 2023-03-27 ENCOUNTER — APPOINTMENT (OUTPATIENT)
Dept: INTERNAL MEDICINE | Facility: CLINIC | Age: 88
End: 2023-03-27
Payer: MEDICARE

## 2023-03-27 VITALS
OXYGEN SATURATION: 98 % | TEMPERATURE: 97.3 F | DIASTOLIC BLOOD PRESSURE: 80 MMHG | HEART RATE: 80 BPM | SYSTOLIC BLOOD PRESSURE: 157 MMHG

## 2023-03-27 VITALS — DIASTOLIC BLOOD PRESSURE: 70 MMHG | SYSTOLIC BLOOD PRESSURE: 140 MMHG

## 2023-03-27 PROCEDURE — 99214 OFFICE O/P EST MOD 30 MIN: CPT

## 2023-03-27 RX ORDER — MUPIROCIN 2 G/100G
2 CREAM TOPICAL TWICE DAILY
Qty: 1 | Refills: 1 | Status: COMPLETED | COMMUNITY
Start: 2023-03-27 | End: 2023-04-10

## 2023-03-27 RX ORDER — MUPIROCIN 20 MG/G
2 OINTMENT TOPICAL TWICE DAILY
Qty: 1 | Refills: 0 | Status: ACTIVE | COMMUNITY
Start: 2023-03-27

## 2023-03-27 NOTE — PHYSICAL EXAM
[No Acute Distress] : no acute distress [Well Nourished] : well nourished [Well Developed] : well developed [Well-Appearing] : well-appearing [Normal Sclera/Conjunctiva] : normal sclera/conjunctiva [EOMI] : extraocular movements intact [Normal Outer Ear/Nose] : the outer ears and nose were normal in appearance [Normal Oropharynx] : the oropharynx was normal [No JVD] : no jugular venous distention [No Lymphadenopathy] : no lymphadenopathy [Supple] : supple [Thyroid Normal, No Nodules] : the thyroid was normal and there were no nodules present [No Respiratory Distress] : no respiratory distress  [No Accessory Muscle Use] : no accessory muscle use [Clear to Auscultation] : lungs were clear to auscultation bilaterally [Normal Rate] : normal rate  [Regular Rhythm] : with a regular rhythm [Normal S1, S2] : normal S1 and S2 [No Murmur] : no murmur heard [No Carotid Bruits] : no carotid bruits [No Abdominal Bruit] : a ~M bruit was not heard ~T in the abdomen [No Varicosities] : no varicosities [Pedal Pulses Present] : the pedal pulses are present [No Edema] : there was no peripheral edema [No Palpable Aorta] : no palpable aorta [No Extremity Clubbing/Cyanosis] : no extremity clubbing/cyanosis [Soft] : abdomen soft [Non Tender] : non-tender [Non-distended] : non-distended [No Masses] : no abdominal mass palpated [No HSM] : no HSM [Normal Bowel Sounds] : normal bowel sounds [Normal Posterior Cervical Nodes] : no posterior cervical lymphadenopathy [Normal Anterior Cervical Nodes] : no anterior cervical lymphadenopathy [No CVA Tenderness] : no CVA  tenderness [No Spinal Tenderness] : no spinal tenderness [No Joint Swelling] : no joint swelling [Grossly Normal Strength/Tone] : grossly normal strength/tone [No Rash] : no rash [Coordination Grossly Intact] : coordination grossly intact [No Focal Deficits] : no focal deficits [Normal Gait] : normal gait [Deep Tendon Reflexes (DTR)] : deep tendon reflexes were 2+ and symmetric [Normal Insight/Judgement] : insight and judgment were intact [de-identified] : Frail, blind, in wheelchair but no distress [de-identified] : umbilicus w surrounding dry skin and erythema, no definite d/c visible, no mass [de-identified] : negative affect

## 2023-03-27 NOTE — ASSESSMENT
[FreeTextEntry1] : Pt as outlined.\par We reviewed the indications for Xarelto and prevention of stroke, the dose = 20 mg and she is on 15 mg.\par Would not go lower than this.\par Will renew the 15 mg, she ran out yesterday.\par Mupirocin ointmt for Omphalitis. If not improving will need Derm, also needs skin check has skin complaints (dryness, sores etc, no sores noted)\par Declines Palll care/pain managt\par Declines anti-depressants or counseling of any kind.\par Check labs when fasting- just had butter on toast, whole milk etc.\par f/u 4 mos
right/elbow

## 2023-03-27 NOTE — HISTORY OF PRESENT ILLNESS
[FreeTextEntry1] : f/u [de-identified] : Almost 89 yo\par Chr pain, DJD, blindness PAF \par on low dos of Xarelto\par She and her son want to know if dose can be lowered further because someone they know said that.\par She has some c/o which she relates to it- dry skin, wheezy or winded if exerts herself, hands/feet cold at times.\par c/o discharge from navel, tries to keep it clean, denies scratching etc.\par Pain: takes Advil 2 tabs bid, not sure it helps.\par Appetite poor mainly because cannot see the food.\par

## 2023-05-12 RX ORDER — RIVAROXABAN 15 MG/1
15 TABLET, FILM COATED ORAL
Qty: 90 | Refills: 3 | Status: DISCONTINUED | COMMUNITY
Start: 2022-08-26 | End: 2023-05-12

## 2023-06-29 ENCOUNTER — APPOINTMENT (OUTPATIENT)
Dept: INTERNAL MEDICINE | Facility: CLINIC | Age: 88
End: 2023-06-29
Payer: MEDICARE

## 2023-06-29 VITALS
TEMPERATURE: 97.7 F | DIASTOLIC BLOOD PRESSURE: 72 MMHG | SYSTOLIC BLOOD PRESSURE: 130 MMHG | HEART RATE: 72 BPM | OXYGEN SATURATION: 95 %

## 2023-06-29 DIAGNOSIS — Z00.00 ENCOUNTER FOR GENERAL ADULT MEDICAL EXAMINATION W/OUT ABNORMAL FINDINGS: ICD-10-CM

## 2023-06-29 DIAGNOSIS — H54.7 UNSPECIFIED VISUAL LOSS: ICD-10-CM

## 2023-06-29 DIAGNOSIS — D50.9 IRON DEFICIENCY ANEMIA, UNSPECIFIED: ICD-10-CM

## 2023-06-29 DIAGNOSIS — E55.9 VITAMIN D DEFICIENCY, UNSPECIFIED: ICD-10-CM

## 2023-06-29 DIAGNOSIS — E78.5 HYPERLIPIDEMIA, UNSPECIFIED: ICD-10-CM

## 2023-06-29 PROCEDURE — 99213 OFFICE O/P EST LOW 20 MIN: CPT

## 2023-06-29 RX ORDER — CELECOXIB 100 MG/1
100 CAPSULE ORAL
Qty: 60 | Refills: 3 | Status: DISCONTINUED | COMMUNITY
Start: 2022-09-08 | End: 2023-06-29

## 2023-06-29 RX ORDER — OXYCODONE AND ACETAMINOPHEN 5; 325 MG/1; MG/1
5-325 TABLET ORAL TWICE DAILY
Qty: 20 | Refills: 0 | Status: DISCONTINUED | COMMUNITY
Start: 2022-07-01 | End: 2023-06-29

## 2023-06-29 NOTE — ASSESSMENT
[FreeTextEntry1] : Pt as outlined.\par Mind is sharp but severe arthritis and blindness.\par check labs.\par f/u 4 mos

## 2023-06-29 NOTE — HISTORY OF PRESENT ILLNESS
[FreeTextEntry1] : f/u [de-identified] : Ongoing issues w d/c from umbilicus; saw Derm, was given ketoconizole and Hydrcorisone crm, no change.\par Not bothering her.\par Aide 4 hr/wk\par \par Advil 2 tabs am and pm. \par seeing Cardiol tomorrow.\par \par Unable to walk, does not leave house often.\par Turning 89 next mo

## 2023-06-30 ENCOUNTER — APPOINTMENT (OUTPATIENT)
Dept: CARDIOLOGY | Facility: CLINIC | Age: 88
End: 2023-06-30
Payer: MEDICARE

## 2023-06-30 ENCOUNTER — NON-APPOINTMENT (OUTPATIENT)
Age: 88
End: 2023-06-30

## 2023-06-30 VITALS — SYSTOLIC BLOOD PRESSURE: 130 MMHG | DIASTOLIC BLOOD PRESSURE: 80 MMHG

## 2023-06-30 VITALS
OXYGEN SATURATION: 94 % | HEART RATE: 74 BPM | BODY MASS INDEX: 26.26 KG/M2 | SYSTOLIC BLOOD PRESSURE: 138 MMHG | DIASTOLIC BLOOD PRESSURE: 81 MMHG | WEIGHT: 130 LBS

## 2023-06-30 DIAGNOSIS — M19.90 UNSPECIFIED OSTEOARTHRITIS, UNSPECIFIED SITE: ICD-10-CM

## 2023-06-30 LAB
25(OH)D3 SERPL-MCNC: 39.9 NG/ML
ALBUMIN SERPL ELPH-MCNC: 4.2 G/DL
ALP BLD-CCNC: 55 U/L
ALT SERPL-CCNC: 11 U/L
ANION GAP SERPL CALC-SCNC: 10 MMOL/L
AST SERPL-CCNC: 18 U/L
BILIRUB SERPL-MCNC: 0.3 MG/DL
BUN SERPL-MCNC: 21 MG/DL
CALCIUM SERPL-MCNC: 9.5 MG/DL
CHLORIDE SERPL-SCNC: 104 MMOL/L
CHOLEST SERPL-MCNC: 234 MG/DL
CO2 SERPL-SCNC: 25 MMOL/L
CREAT SERPL-MCNC: 0.96 MG/DL
EGFR: 52 ML/MIN/1.73M2
ESTIMATED AVERAGE GLUCOSE: 134 MG/DL
GLUCOSE SERPL-MCNC: 106 MG/DL
HBA1C MFR BLD HPLC: 6.3 %
HDLC SERPL-MCNC: 78 MG/DL
LDLC SERPL CALC-MCNC: 117 MG/DL
NONHDLC SERPL-MCNC: 156 MG/DL
POTASSIUM SERPL-SCNC: 4.7 MMOL/L
PROT SERPL-MCNC: 7.6 G/DL
SODIUM SERPL-SCNC: 140 MMOL/L
TRIGL SERPL-MCNC: 197 MG/DL
TSH SERPL-ACNC: 2.1 UIU/ML

## 2023-06-30 PROCEDURE — 93000 ELECTROCARDIOGRAM COMPLETE: CPT

## 2023-06-30 PROCEDURE — 99214 OFFICE O/P EST MOD 30 MIN: CPT | Mod: 25

## 2023-06-30 NOTE — HISTORY OF PRESENT ILLNESS
[FreeTextEntry1] : 89-year-old female with a history of paroxysmal atrial fibrillation, chronic dyspnea on exertion of undetermined cause. and osteoarthritis returns for follow-up.  She was last seen 6 months ago.  She has not had any palpitations during this time.  She continues to be limited primarily by osteoarthritis in her knees and hips.  She is taking up to 4 Aleve a day and is also noted some swelling.  She had blood work done yesterday at her internist and her kidney function remains normal.

## 2023-06-30 NOTE — DISCUSSION/SUMMARY
[FreeTextEntry1] : Ms Emmanuel has not had any palpitations.  She continues to note dyspnea and has had some peripheral edema.  She is taking up to 4 Aleve per day.  Her exam shows normal blood pressure, no change in her weight, clear lungs, and a normal cardiac exam.  She has about 1+ edema.  Her EKG is within normal limits.\par \par She continues to have good control of her atrial fibs and will continue on metoprolol 25 twice daily and Xarelto.  The edema is not very severe and probably related to using NSAIDs and immobility.  She will follow-up in 6 months. [EKG obtained to assist in diagnosis and management of assessed problem(s)] : EKG obtained to assist in diagnosis and management of assessed problem(s)

## 2023-07-03 ENCOUNTER — RX RENEWAL (OUTPATIENT)
Age: 88
End: 2023-07-03

## 2023-07-09 ENCOUNTER — RX RENEWAL (OUTPATIENT)
Age: 88
End: 2023-07-09

## 2023-08-06 NOTE — ASSESSMENT
[FreeTextEntry1] : Elderly and frail, now grieving the loss of another son last month.\par Right shoulder pain since falling last yr, exam neg; could consider xray or MRI but pt declines for now.\par check routine labs today.\par Emotional support  and condolences offered. \par Keep up her nutrition as able.\par contin Mirtazapine.\par f/u 3 mos. No

## 2023-08-18 ENCOUNTER — RX RENEWAL (OUTPATIENT)
Age: 88
End: 2023-08-18

## 2023-10-09 ENCOUNTER — RX RENEWAL (OUTPATIENT)
Age: 88
End: 2023-10-09

## 2023-10-12 ENCOUNTER — RX RENEWAL (OUTPATIENT)
Age: 88
End: 2023-10-12

## 2023-10-23 ENCOUNTER — RX RENEWAL (OUTPATIENT)
Age: 88
End: 2023-10-23

## 2023-10-25 ENCOUNTER — NON-APPOINTMENT (OUTPATIENT)
Age: 88
End: 2023-10-25

## 2023-10-26 ENCOUNTER — APPOINTMENT (OUTPATIENT)
Dept: INTERNAL MEDICINE | Facility: CLINIC | Age: 88
End: 2023-10-26
Payer: MEDICARE

## 2023-10-26 VITALS
HEART RATE: 82 BPM | DIASTOLIC BLOOD PRESSURE: 83 MMHG | HEIGHT: 59 IN | OXYGEN SATURATION: 96 % | BODY MASS INDEX: 26.21 KG/M2 | RESPIRATION RATE: 15 BRPM | WEIGHT: 130 LBS | SYSTOLIC BLOOD PRESSURE: 136 MMHG

## 2023-10-26 DIAGNOSIS — R06.2 WHEEZING: ICD-10-CM

## 2023-10-26 PROCEDURE — G0008: CPT

## 2023-10-26 PROCEDURE — 90662 IIV NO PRSV INCREASED AG IM: CPT

## 2023-10-26 PROCEDURE — 99213 OFFICE O/P EST LOW 20 MIN: CPT | Mod: 25

## 2023-10-26 RX ORDER — ALBUTEROL SULFATE 90 UG/1
108 (90 BASE) AEROSOL, METERED RESPIRATORY (INHALATION)
Qty: 1 | Refills: 1 | Status: ACTIVE | COMMUNITY
Start: 2023-10-26 | End: 1900-01-01

## 2023-10-27 ENCOUNTER — RX RENEWAL (OUTPATIENT)
Age: 88
End: 2023-10-27

## 2023-10-27 RX ORDER — APIXABAN 2.5 MG/1
2.5 TABLET, FILM COATED ORAL
Qty: 60 | Refills: 9 | Status: ACTIVE | COMMUNITY
Start: 2023-05-12 | End: 1900-01-01

## 2023-11-27 ENCOUNTER — APPOINTMENT (OUTPATIENT)
Dept: INTERNAL MEDICINE | Facility: CLINIC | Age: 88
End: 2023-11-27
Payer: MEDICARE

## 2023-11-27 VITALS
RESPIRATION RATE: 15 BRPM | SYSTOLIC BLOOD PRESSURE: 157 MMHG | OXYGEN SATURATION: 97 % | WEIGHT: 126 LBS | DIASTOLIC BLOOD PRESSURE: 83 MMHG | BODY MASS INDEX: 25.45 KG/M2 | HEART RATE: 80 BPM

## 2023-11-27 DIAGNOSIS — M25.561 PAIN IN RIGHT KNEE: ICD-10-CM

## 2023-11-27 DIAGNOSIS — G89.29 OTHER CHRONIC PAIN: ICD-10-CM

## 2023-11-27 DIAGNOSIS — R39.9 UNSPECIFIED SYMPTOMS AND SIGNS INVOLVING THE GENITOURINARY SYSTEM: ICD-10-CM

## 2023-11-27 DIAGNOSIS — M25.562 PAIN IN RIGHT KNEE: ICD-10-CM

## 2023-11-27 PROCEDURE — 99213 OFFICE O/P EST LOW 20 MIN: CPT

## 2023-11-28 LAB
APPEARANCE: CLEAR
BILIRUBIN URINE: NEGATIVE
BLOOD URINE: NEGATIVE
COLOR: YELLOW
GLUCOSE QUALITATIVE U: NEGATIVE MG/DL
KETONES URINE: NEGATIVE MG/DL
LEUKOCYTE ESTERASE URINE: NEGATIVE
NITRITE URINE: NEGATIVE
PH URINE: 6
PROTEIN URINE: NEGATIVE MG/DL
SPECIFIC GRAVITY URINE: 1.02
UROBILINOGEN URINE: 0.2 MG/DL

## 2023-12-06 ENCOUNTER — NON-APPOINTMENT (OUTPATIENT)
Age: 88
End: 2023-12-06

## 2024-01-03 ENCOUNTER — RX RENEWAL (OUTPATIENT)
Age: 89
End: 2024-01-03

## 2024-01-03 RX ORDER — OMEPRAZOLE 40 MG/1
40 CAPSULE, DELAYED RELEASE ORAL
Qty: 90 | Refills: 0 | Status: ACTIVE | COMMUNITY
Start: 2019-03-22 | End: 1900-01-01

## 2024-01-08 ENCOUNTER — NON-APPOINTMENT (OUTPATIENT)
Age: 89
End: 2024-01-08

## 2024-01-08 ENCOUNTER — APPOINTMENT (OUTPATIENT)
Dept: CARDIOLOGY | Facility: CLINIC | Age: 89
End: 2024-01-08
Payer: MEDICARE

## 2024-01-08 VITALS
OXYGEN SATURATION: 97 % | WEIGHT: 125 LBS | SYSTOLIC BLOOD PRESSURE: 126 MMHG | BODY MASS INDEX: 25.25 KG/M2 | HEART RATE: 77 BPM | DIASTOLIC BLOOD PRESSURE: 63 MMHG

## 2024-01-08 DIAGNOSIS — R06.00 DYSPNEA, UNSPECIFIED: ICD-10-CM

## 2024-01-08 DIAGNOSIS — R26.81 UNSTEADINESS ON FEET: ICD-10-CM

## 2024-01-08 PROCEDURE — 99213 OFFICE O/P EST LOW 20 MIN: CPT | Mod: 25

## 2024-01-08 PROCEDURE — 93000 ELECTROCARDIOGRAM COMPLETE: CPT

## 2024-01-08 NOTE — HISTORY OF PRESENT ILLNESS
[FreeTextEntry1] : 89-year-old female with a history of paroxysmal atrial fibrillation, dyspnea on exertion of undetermined cause, osteoarthritis.  She was last seen in 7/23.  She is not having any palpitations.  Her mobility continues to deteriorate and she is primarily in a wheelchair.  She remains alert and complains of some difficulty with hearing.

## 2024-01-08 NOTE — DISCUSSION/SUMMARY
[FreeTextEntry1] : Mrs. Emmanuel remains without any palpitations or other cardiac symptoms.  Her exam shows an alert elderly female examined in a wheelchair.  Her pulse was 60 with premature contractions, blood pressure 110/70, chest clear, and cardiac exam within normal limits.  An EKG showed frequent APCs.  It is unchanged and otherwise normal.  She continues doing well and will remain on Eliquis and metoprolol.  She will follow-up in 6 months. [EKG obtained to assist in diagnosis and management of assessed problem(s)] : EKG obtained to assist in diagnosis and management of assessed problem(s)

## 2024-01-16 ENCOUNTER — NON-APPOINTMENT (OUTPATIENT)
Age: 89
End: 2024-01-16

## 2024-01-16 ENCOUNTER — APPOINTMENT (OUTPATIENT)
Dept: GERIATRICS | Facility: CLINIC | Age: 89
End: 2024-01-16
Payer: MEDICARE

## 2024-01-16 VITALS
BODY MASS INDEX: 25.6 KG/M2 | TEMPERATURE: 97.3 F | HEIGHT: 59 IN | HEART RATE: 64 BPM | WEIGHT: 127 LBS | SYSTOLIC BLOOD PRESSURE: 124 MMHG | DIASTOLIC BLOOD PRESSURE: 72 MMHG

## 2024-01-16 DIAGNOSIS — F32.A DEPRESSION, UNSPECIFIED: ICD-10-CM

## 2024-01-16 DIAGNOSIS — G62.9 POLYNEUROPATHY, UNSPECIFIED: ICD-10-CM

## 2024-01-16 DIAGNOSIS — I48.0 PAROXYSMAL ATRIAL FIBRILLATION: ICD-10-CM

## 2024-01-16 DIAGNOSIS — R73.03 PREDIABETES.: ICD-10-CM

## 2024-01-16 DIAGNOSIS — R26.81 UNSTEADINESS ON FEET: ICD-10-CM

## 2024-01-16 DIAGNOSIS — Z71.89 OTHER SPECIFIED COUNSELING: ICD-10-CM

## 2024-01-16 PROCEDURE — 99205 OFFICE O/P NEW HI 60 MIN: CPT | Mod: 25

## 2024-01-16 PROCEDURE — 99215 OFFICE O/P EST HI 40 MIN: CPT | Mod: 25

## 2024-01-16 PROCEDURE — 99498 ADVNCD CARE PLAN ADDL 30 MIN: CPT

## 2024-01-16 PROCEDURE — 99497 ADVNCD CARE PLAN 30 MIN: CPT

## 2024-01-16 RX ORDER — FERROUS SULFATE 325(65) MG
325 (65 FE) TABLET ORAL
Refills: 0 | Status: DISCONTINUED | COMMUNITY
End: 2024-01-16

## 2024-01-16 RX ORDER — METOPROLOL TARTRATE 25 MG/1
25 TABLET, FILM COATED ORAL TWICE DAILY
Qty: 90 | Refills: 3 | Status: ACTIVE | COMMUNITY
Start: 2022-08-26

## 2024-01-16 NOTE — REVIEW OF SYSTEMS
[Eyesight Problems] : eyesight problems [Loss Of Hearing] : hearing loss [SOB on Exertion] : shortness of breath during exertion [Incontinence] : incontinence [Difficulty Walking] : difficulty walking [Negative] : Heme/Lymph [Fever] : no fever [Chills] : no chills [Chest Pain] : no chest pain [Lower Ext Edema] : no lower extremity edema [Dysuria] : no dysuria [Limb Swelling] : no limb swelling [FreeTextEntry4] : Awaiting hearing aids [FreeTextEntry8] : Recent UTI, dysuria resolved

## 2024-01-16 NOTE — PHYSICAL EXAM
[Alert] : alert [No Acute Distress] : in no acute distress [EOMI] : extraocular movements were intact [Normal Outer Ear/Nose] : the ears and nose were normal in appearance [Supple] : the neck was supple [Auscultation Breath Sounds / Voice Sounds] : lungs were clear to auscultation bilaterally [Edema] : edema was not present [Abdomen Tenderness] : non-tender [Cervical Lymph Nodes Enlarged Posterior Bilaterally] : posterior cervical [Abdomen Soft] : soft [Supraclavicular Lymph Nodes Enlarged Bilaterally] : supraclavicular [Cervical Lymph Nodes Enlarged Anterior Bilaterally] : anterior cervical, supraclavicular [No Spinal Tenderness] : no spinal tenderness [Involuntary Movements] : no involuntary movements were seen [Normal Color / Pigmentation] : normal skin color and pigmentation [No Focal Deficits] : no focal deficits [Normal Affect] : the affect was normal [de-identified] : Irregularly irregular [de-identified] : Day, date, season, month, year

## 2024-01-16 NOTE — REASON FOR VISIT
[Initial Evaluation] : an initial evaluation [Family Member] : family member [FreeTextEntry1] : CGE, establish care [FreeTextEntry2] : Yuri Yeh

## 2024-01-16 NOTE — HISTORY OF PRESENT ILLNESS
[Two or more falls in past year] : Patient reported two or more falls in the past year [] : Assistance needed managing finances. [Little interest or pleasure doing things] : 1) Little interest or pleasure doing things [0] : 1) Little interest or pleasure doing things: Not at all (0) [Feeling down, depressed, or hopeless] : 2) Feeling down, depressed, or hopeless [1] : 2) Feeling down, depressed, or hopeless for several days (1) [PHQ-2 Negative - No further assessment needed] : PHQ-2 Negative - No further assessment needed [FreeTextEntry1] : Mrs. Emmanuel is a 89-year-old woman presenting to [facility].  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  She lives with her son Rao in a multilevel home with a stair lift.  The patient is minimally ambulatory, she requires mod to max assist to stand, ambulates with a 3 wheeled walker.  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  She admits to at least 2 falls in the past few months, but states that both were due to unforeseeable issues. The patient states that in the last 3 to 6 months she is experienced increased loss of sensation in her fingertips and toes. [[id number]] : 1

## 2024-01-17 LAB
ALBUMIN SERPL ELPH-MCNC: 3.9 G/DL
ALP BLD-CCNC: 68 U/L
ALT SERPL-CCNC: 34 U/L
ANION GAP SERPL CALC-SCNC: 14 MMOL/L
AST SERPL-CCNC: 26 U/L
BASOPHILS # BLD AUTO: 0.07 K/UL
BASOPHILS NFR BLD AUTO: 1.1 %
BILIRUB SERPL-MCNC: 0.3 MG/DL
BUN SERPL-MCNC: 18 MG/DL
CALCIUM SERPL-MCNC: 9.2 MG/DL
CHLORIDE SERPL-SCNC: 92 MMOL/L
CO2 SERPL-SCNC: 24 MMOL/L
CREAT SERPL-MCNC: 0.86 MG/DL
EGFR: 60 ML/MIN/1.73M2
EOSINOPHIL # BLD AUTO: 0.1 K/UL
EOSINOPHIL NFR BLD AUTO: 1.6 %
ESTIMATED AVERAGE GLUCOSE: 134 MG/DL
FOLATE SERPL-MCNC: >20 NG/ML
GLUCOSE SERPL-MCNC: 127 MG/DL
HBA1C MFR BLD HPLC: 6.3 %
HCT VFR BLD CALC: 37.5 %
HGB BLD-MCNC: 12.2 G/DL
IMM GRANULOCYTES NFR BLD AUTO: 0.3 %
LYMPHOCYTES # BLD AUTO: 1.39 K/UL
LYMPHOCYTES NFR BLD AUTO: 22.6 %
MAN DIFF?: NORMAL
MCHC RBC-ENTMCNC: 30.2 PG
MCHC RBC-ENTMCNC: 32.5 GM/DL
MCV RBC AUTO: 92.8 FL
MONOCYTES # BLD AUTO: 0.62 K/UL
MONOCYTES NFR BLD AUTO: 10.1 %
NEUTROPHILS # BLD AUTO: 3.95 K/UL
NEUTROPHILS NFR BLD AUTO: 64.3 %
PLATELET # BLD AUTO: 252 K/UL
POTASSIUM SERPL-SCNC: 4.4 MMOL/L
PROT SERPL-MCNC: 7.2 G/DL
RBC # BLD: 4.04 M/UL
RBC # FLD: 13.1 %
SODIUM SERPL-SCNC: 129 MMOL/L
TSH SERPL-ACNC: 3.14 UIU/ML
VIT B12 SERPL-MCNC: 1903 PG/ML
WBC # FLD AUTO: 6.15 K/UL

## 2024-01-22 ENCOUNTER — APPOINTMENT (OUTPATIENT)
Dept: INTERNAL MEDICINE | Facility: CLINIC | Age: 89
End: 2024-01-22

## 2024-01-30 ENCOUNTER — APPOINTMENT (OUTPATIENT)
Dept: GERIATRICS | Facility: CLINIC | Age: 89
End: 2024-01-30
Payer: MEDICARE

## 2024-01-30 VITALS — DIASTOLIC BLOOD PRESSURE: 70 MMHG | SYSTOLIC BLOOD PRESSURE: 129 MMHG

## 2024-01-30 PROCEDURE — 99213 OFFICE O/P EST LOW 20 MIN: CPT | Mod: 95

## 2024-01-30 NOTE — HISTORY OF PRESENT ILLNESS
[Home] : at home, [unfilled] , at the time of the visit. [Medical Office: ([facility])___] : at the medical office located in  [Verbal consent obtained from patient] : the patient, [unfilled] [FreeTextEntry4] : Yuri Yeh [FreeTextEntry1] : Mrs. Emmanuel is a 89-year-old woman last seen in the office about 2 weeks ago.  She has had on and off lower extremity edema since the last visit states that edema has gotten worse and she is having pain in her ankles and shins.  Patient does not follow a strict low-salt diet, uses NSAIDs about every 2 to 3 days for chronic pain.  She has not been very mobile, sits most of the day without elevating her legs.  She feels otherwise well.  She has chronic exertional dyspnea which is not changed, denies chest pain or palpitations and feels otherwise well. 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  Son denies any change in character or drainage and there is no significant skin erythema. Per son last blood pressure at home was approximately 129/70.

## 2024-01-30 NOTE — PHYSICAL EXAM
[Alert] : alert [Normal Outer Ear/Nose] : the ears and nose were normal in appearance [Respiration, Rhythm And Depth] : normal respiratory rhythm and effort [Involuntary Movements] : no involuntary movements were seen [de-identified] : 2+ bilateral lower extremity pitting edema, very mild pretibial erythema, Blanches when depressed

## 2024-01-31 RX ORDER — FUROSEMIDE 20 MG/1
20 TABLET ORAL
Qty: 90 | Refills: 0 | Status: ACTIVE | COMMUNITY
Start: 2024-01-30 | End: 1900-01-01

## 2024-02-09 ENCOUNTER — APPOINTMENT (OUTPATIENT)
Dept: GERIATRICS | Facility: CLINIC | Age: 89
End: 2024-02-09
Payer: MEDICARE

## 2024-02-09 VITALS — SYSTOLIC BLOOD PRESSURE: 120 MMHG | DIASTOLIC BLOOD PRESSURE: 70 MMHG

## 2024-02-09 DIAGNOSIS — R53.81 OTHER MALAISE: ICD-10-CM

## 2024-02-09 DIAGNOSIS — R60.0 LOCALIZED EDEMA: ICD-10-CM

## 2024-02-09 PROCEDURE — 99213 OFFICE O/P EST LOW 20 MIN: CPT | Mod: 95

## 2024-02-09 NOTE — HISTORY OF PRESENT ILLNESS
[FreeTextEntry1] : Mrs. Emmanuel is seen in telehealth visit for follow-up of edema, encounter was facilitated by her son Rao.  Rao states that for the past week the patient has not been doing well, mostly bedbound with low O2 sats although she does not appear short of breath.  She did go to the rheumatologist yesterday where her blood pressure was 120/70, no new interventions.  Patient is clear that she does not wish to go to the hospital under any circumstances.  Rao contacted [facility] and is requesting that clinicals be faxed there. Edema is a bit better.  Patient is not eating or drinking much, complains "I feel like I am out of it" Patient has been afebrile.  No complaints of headaches, chest pain, abdominal pain, dysuria.

## 2024-02-09 NOTE — REASON FOR VISIT
[Home] : at home, [unfilled] , at the time of the visit. [Medical Office: ([facility])___] : at the medical office located in  [Patient] : the patient [FreeTextEntry4] : gissell Yeh [Follow-Up] : a follow-up visit [FreeTextEntry1] : edema

## 2024-02-09 NOTE — PHYSICAL EXAM
[Normal Outer Ear/Nose] : the ears and nose were normal in appearance [Respiration, Rhythm And Depth] : normal respiratory rhythm and effort [Involuntary Movements] : no involuntary movements were seen [de-identified] : Extremely frail appearing, NAD [de-identified] : Bilateral lower extremity edema, slightly improved since last week [de-identified] : Moves all extremities

## 2024-02-09 NOTE — REVIEW OF SYSTEMS
[As Noted in HPI] : as noted in HPI [Loss Of Hearing] : hearing loss [Lower Ext Edema] : lower extremity edema [Dysuria] : no dysuria [Difficulty Walking] : difficulty walking [Negative] : Heme/Lymph [FreeTextEntry2] : Decreased p.o. intake [FreeTextEntry3] : Blind [FreeTextEntry6] : Son notes "wheezing" with exertion [de-identified] : Needs at least moderate assist to stand

## 2024-02-23 ENCOUNTER — NON-APPOINTMENT (OUTPATIENT)
Age: 89
End: 2024-02-23

## 2024-04-18 NOTE — ED ADULT NURSE NOTE - NSICDXPASTSURGICALHX_GEN_ALL_CORE_FT
PAST SURGICAL HISTORY:  History of Cholecystectomy 2007. Laproscopic    S/P Hernia Repair umbilical  2007    S/P Tonsillectomy and Adenoidectomy age 3     No

## 2024-05-17 ENCOUNTER — APPOINTMENT (OUTPATIENT)
Dept: GERIATRICS | Facility: CLINIC | Age: 89
End: 2024-05-17

## 2024-07-08 ENCOUNTER — APPOINTMENT (OUTPATIENT)
Dept: CARDIOLOGY | Facility: CLINIC | Age: 89
End: 2024-07-08